# Patient Record
Sex: FEMALE | Race: BLACK OR AFRICAN AMERICAN | NOT HISPANIC OR LATINO | Employment: FULL TIME | URBAN - METROPOLITAN AREA
[De-identification: names, ages, dates, MRNs, and addresses within clinical notes are randomized per-mention and may not be internally consistent; named-entity substitution may affect disease eponyms.]

---

## 2017-09-13 ENCOUNTER — OFFICE VISIT (OUTPATIENT)
Dept: FAMILY MEDICINE | Facility: CLINIC | Age: 47
End: 2017-09-13
Payer: COMMERCIAL

## 2017-09-13 ENCOUNTER — RADIANT APPOINTMENT (OUTPATIENT)
Dept: GENERAL RADIOLOGY | Facility: CLINIC | Age: 47
End: 2017-09-13
Attending: PHYSICIAN ASSISTANT
Payer: COMMERCIAL

## 2017-09-13 VITALS
SYSTOLIC BLOOD PRESSURE: 157 MMHG | HEIGHT: 64 IN | BODY MASS INDEX: 30.22 KG/M2 | WEIGHT: 177 LBS | OXYGEN SATURATION: 96 % | HEART RATE: 75 BPM | TEMPERATURE: 98.4 F | DIASTOLIC BLOOD PRESSURE: 101 MMHG

## 2017-09-13 DIAGNOSIS — R51.9 CHRONIC NONINTRACTABLE HEADACHE, UNSPECIFIED HEADACHE TYPE: ICD-10-CM

## 2017-09-13 DIAGNOSIS — M25.552 HIP PAIN, LEFT: ICD-10-CM

## 2017-09-13 DIAGNOSIS — N64.4 MASTODYNIA: ICD-10-CM

## 2017-09-13 DIAGNOSIS — I10 ESSENTIAL HYPERTENSION WITH GOAL BLOOD PRESSURE LESS THAN 130/80: ICD-10-CM

## 2017-09-13 DIAGNOSIS — Z00.00 ROUTINE GENERAL MEDICAL EXAMINATION AT A HEALTH CARE FACILITY: Primary | ICD-10-CM

## 2017-09-13 DIAGNOSIS — D24.9 FIBROADENOMA OF BREAST, UNSPECIFIED LATERALITY: ICD-10-CM

## 2017-09-13 DIAGNOSIS — R22.1 LUMP IN NECK: ICD-10-CM

## 2017-09-13 DIAGNOSIS — G89.29 CHRONIC NONINTRACTABLE HEADACHE, UNSPECIFIED HEADACHE TYPE: ICD-10-CM

## 2017-09-13 PROCEDURE — 72170 X-RAY EXAM OF PELVIS: CPT

## 2017-09-13 PROCEDURE — 99213 OFFICE O/P EST LOW 20 MIN: CPT | Mod: 25 | Performed by: PHYSICIAN ASSISTANT

## 2017-09-13 PROCEDURE — 99396 PREV VISIT EST AGE 40-64: CPT | Performed by: PHYSICIAN ASSISTANT

## 2017-09-13 PROCEDURE — 73502 X-RAY EXAM HIP UNI 2-3 VIEWS: CPT

## 2017-09-13 RX ORDER — LISINOPRIL 2.5 MG/1
2.5 TABLET ORAL DAILY
Qty: 30 TABLET | Refills: 1 | Status: SHIPPED | OUTPATIENT
Start: 2017-09-13 | End: 2017-10-16

## 2017-09-13 NOTE — MR AVS SNAPSHOT
After Visit Summary   9/13/2017    Makayla Diaz    MRN: [id number]           Patient Information     Date Of Birth          1970        Visit Information        Provider Department      9/13/2017 9:00 AM Angelica Hills PA-C [facility]        Today's Diagnoses     Routine general medical examination at a health care facility    -  1    Essential hypertension with goal blood pressure less than 130/80        Fibroadenoma of breast, unspecified laterality        Mastodynia        Chronic nonintractable headache, unspecified headache type        Hip pain, left        Lump in neck          Care Instructions    Start lisinopril for blood pressure             Follow-ups after your visit        Follow-up notes from your care team     Return in about 4 weeks (around 10/11/2017) for BP Recheck.      Your next 10 appointments already scheduled     Sep 14, 2017  7:15 AM CDT   MA SCREENING DIGITAL BILATERAL with FKMA1   [facility] [place] ([facility])    [address] [place] [id number] [phone]           Do not use any powder, lotion or deodorant under your arms or on your breast. If you do, we will ask you to remove it before your exam.  Wear comfortable, two-piece clothing.  If you have any allergies, tell your care team.  Bring any previous mammograms from other facilities or have them mailed to the breast center.            Sep 18, 2017  8:00 AM CDT   US HEAD NECK SOFT TISSUE with BEUS1   [facility] [place] ([facility])    [address] [id number] [phone]           Please bring a list of your medicines (including vitamins, minerals and over-the-counter drugs). Also, tell your doctor about any allergies you may have. Wear comfortable clothes and leave your valuables at home.  You do not need to do anything special to prepare for your exam.  Please call the Imaging Department at  your exam site with any questions.            Sep 18, 2017  8:30 AM CDT   CT MAXILLOFACIAL W/O CONTRAST with BECT1   [facility] [place] ([facility])    [address]  [place] [id number] [phone]           Please bring any scans or X-rays taken at other hospitals, if similar tests were done. Also bring a list of your medicines, including vitamins, minerals and over-the-counter drugs. It is safest to leave personal items at home.  Be sure to tell your doctor:   If you have any allergies.   If there s any chance you are pregnant.   If you are breastfeeding.   If you have any special needs.  You do not need to do anything special to prepare.  Please wear loose clothing, such as a sweat suit or jogging clothes. Avoid snaps, zippers and other metal. We may ask you to undress and put on a hospital gown.            Oct 11, 2017  8:40 AM CDT   Office Visit with Angelica Hills PA-C   [facility]    [address] [address] [phone]           Bring a current list of meds and any records pertaining to this visit. For Physicals, please bring immunization records and any forms needing to be filled out. Please arrive 10 minutes early to complete paperwork.              Future tests that were ordered for you today     Open Future Orders        Priority Expected Expires Ordered    MA Screening Digital Bilateral Routine  9/13/2018 9/13/2017    XR Pelvis 1/2 Views Routine 9/13/2017 9/13/2018 9/13/2017    XR Hip Left 2-3 Views Routine 9/13/2017 9/13/2018 9/13/2017    CT Maxillofacial w/o Contrast Routine  9/13/2018 9/13/2017    US Head Neck Soft Tissue Routine  9/13/2018 9/13/2017            Who to contact     If you have questions or need follow up information about today's clinic visit or your schedule please contact [facility] directly at [phone].  Normal or  "non-critical lab and imaging results will be communicated to you by MyChart, letter or phone within 4 business days after the clinic has received the results. If you do not hear from us within 7 days, please contact the clinic through [employer] or phone. If you have a critical or abnormal lab result, we will notify you by phone as soon as possible.  Submit refill requests through [employer] or call your pharmacy and they will forward the refill request to us. Please allow 3 business days for your refill to be completed.          Additional Information About Your Visit        [facility] Information     [employer] gives you secure access to your electronic health record. If you see a primary care provider, you can also send messages to your care team and make appointments. If you have questions, please call your primary care clinic.  If you do not have a primary care provider, please call [phone] and they will assist you.        Care EveryWhere ID     This is your Care EveryWhere ID. This could be used by other organizations to access your [facility] medical records  [id number]        Your Vitals Were     Pulse Temperature Height Last Period Pulse Oximetry BMI (Body Mass Index)    75 98.4  F (36.9  C) (Oral) 5' 4.25\" (1.632 m) 02/28/2014 96% 30.15 kg/m2       Blood Pressure from Last 3 Encounters:   09/13/17 (!) 157/101   08/05/16 (!) 130/99   07/11/16 118/88    Weight from Last 3 Encounters:   09/13/17 177 lb (80.3 kg)   08/02/16 174 lb (78.9 kg)   07/11/16 175 lb (79.4 kg)                 Where to get your medicines      These medications were sent to [facility] - [place], [place] - [address]  [address], [facility] [address]     Phone:  [phone]     lisinopril 2.5 MG tablet          Primary Care Provider Office Phone # Fax #    Angelica Hills PA-C [phone]       [address]  [facility] [address]        Equal Access to " Services     [facility]: Hadii aad ku hadbrittnialejandro Patriziadavid, waaxda luqadaha, qaybta kaalmatrav cedeno, ba contreras. So [facility] [phone].    ATENCIÓN: Si habla español, tiene a alex disposición servicios gratuitos de asistencia lingüística. Llame al [phone].    We comply with applicable federal civil rights laws and [place] laws. We do not discriminate on the basis of race, color, national origin, age, disability sex, sexual orientation or gender identity.            Thank you!     Thank you for choosing [facility]  for your care. Our goal is always to provide you with excellent care. Hearing back from our patients is one way we can continue to improve our services. Please take a few minutes to complete the written survey that you may receive in the mail after your visit with us. Thank you!             Your Updated Medication List - Protect others around you: Learn how to safely use, store and throw away your medicines at www.disposemymeds.org.          This list is accurate as of: 9/13/17 10:18 AM.  Always use your most recent med list.                   Brand Name Dispense Instructions for use Diagnosis    diphenhydrAMINE-acetaminophen  MG tablet    TYLENOL PM     Take 1 tablet by mouth nightly as needed for sleep        lisinopril 2.5 MG tablet    PRINIVIL/Zestril    30 tablet    Take 1 tablet (2.5 mg) by mouth daily    Essential hypertension with goal blood pressure less than 130/80

## 2017-09-13 NOTE — PROGRESS NOTES
SUBJECTIVE:   CC: Makayla Diaz is an 47 year old woman who presents for preventive health visit.     Physical   Annual:     Getting at least 3 servings of Calcium per day::  Yes    Bi-annual eye exam::  Yes    Dental care twice a year::  Yes    Sleep apnea or symptoms of sleep apnea::  None    Diet::  Regular (no restrictions)    Frequency of exercise::  1 day/week    Duration of exercise::  Less than 15 minutes    Taking medications regularly::  Yes    Medication side effects::  None    Additional concerns today::  YES          Left hip pain fell in September of 2013.  Landed right on left hip.  Was better for some time but now every once in a while her leg hurts with numbness in foot.  Has back pain with sciatic on right side.  Never been seen for hip.     lump in left side of neck x 2 months.  Is painful.  No fevers.  Has night sweats before.  No weight loss.    Light sleeper always.  Tylenol pm does help.    Headaches since she had children.  Sleep didn't change.    Has hx of chronic sinus congestion   Stopped blood pressure medication due to running out.        Today's PHQ-2 Score:   PHQ-2 ( 1999 Pfizer) 9/13/2017   Q1: Little interest or pleasure in doing things 0   Q2: Feeling down, depressed or hopeless 0   PHQ-2 Score 0   Q1: Little interest or pleasure in doing things Not at all   Q2: Feeling down, depressed or hopeless Not at all   PHQ-2 Score 0       Abuse: Current or Past(Physical, Sexual or Emotional)- No  Do you feel safe in your environment - Yes    Social History   Substance Use Topics     Smoking status: Never Smoker     Smokeless tobacco: Never Used     Alcohol use Yes      Comment: 0-2 glasses wine monthly     The patient does not drink >3 drinks per day nor >7 drinks per week.    Reviewed orders with patient.  Reviewed health maintenance and updated orders accordingly - Yes      Patient under age 50, mutual decision reflected in health maintenance.        Pertinent mammograms are  "reviewed under the imaging tab.  History of abnormal Pap smear: Status post benign hysterectomy. Health Maintenance and Surgical History updated.    Reviewed and updated as needed this visit by clinical staffTobacco  Allergies  Med Hx  Surg Hx  Fam Hx  Soc Hx        Reviewed and updated as needed this visit by Provider              ROS:  C: NEGATIVE for fever, chills, change in weight  I: NEGATIVE for worrisome rashes, moles or lesions  E: NEGATIVE for vision changes or irritation  ENT: hearing loss in left ear, has hx of sinus problems   R: NEGATIVE for significant cough or SOB  BREAST: has breast lumps and breat pain for years   CV: NEGATIVE for chest pain, palpitations or peripheral edema  GI: occasional cramping lower abdomen.  Had hysterectomy but has one ovary.  Does alternate between constipation and soft stools.  Had a colonoscopy last year that was negative.   : NEGATIVE for unusual urinary or vaginal symptoms. No vaginal bleeding.  MUSCULOSKELETAL:left hip ain   NEURO: daily headaches for years also on left side, sometimes has light sensitivity, doesn't take anything for them.  Using tylenol PM nightly for pain and sleep   P: NEGATIVE for changes in mood or affect      OBJECTIVE:   BP (!) 157/101 (BP Location: Right arm, Patient Position: Chair, Cuff Size: Adult Regular)  Pulse 75  Temp 98.4  F (36.9  C) (Oral)  Ht 5' 4.25\" (1.632 m)  Wt 177 lb (80.3 kg)  LMP 02/28/2014  SpO2 96%  BMI 30.15 kg/m2  EXAM:  GENERAL: alert, no distress and obese  EYES: Eyes grossly normal to inspection, PERRL and conjunctivae and sclerae normal  HENT: ear canals and TM's normal, nose and mouth without ulcers or lesions, oropharynx clear, oral mucous membranes moist and sinuses: maxillary, frontal tenderness on bilaterally  NECK: no asymmetry, masses, or scars, thyroid normal to palpation and small firm mass on left anterior neck lateral to thyroid   RESP: lungs clear to auscultation - no rales, rhonchi or " wheezes  BREAST: no palpable axillary masses or adenopathy and tenderness bilateral  CV: regular rate and rhythm, normal S1 S2, no S3 or S4, no murmur, click or rub, no peripheral edema and peripheral pulses strong  ABDOMEN: tenderness generalized and bowel sounds normal  MS: tenderness over left hip and groin, pain with both internal and external rotation of left hip  SKIN: no suspicious lesions or rashes  Comprehensive back pain exam:  Tenderness of low back central , Lower extremity strength functional and equal on both sides, Lower extremity reflexes within normal limits bilaterally and Straight leg positive on  left, indicating possible ipsilateral radiculopathy  PSYCH: mentation appears normal, affect normal/bright    ASSESSMENT/PLAN:   1. Routine general medical examination at a health care facility  Overall healthy.  Work on diet and exercise  - MA Screening Digital Bilateral; Future    2. Essential hypertension with goal blood pressure less than 130/80  Restart medications.  Follow up in one month.  Low dose was effective before  - lisinopril (PRINIVIL/ZESTRIL) 2.5 MG tablet; Take 1 tablet (2.5 mg) by mouth daily  Dispense: 30 tablet; Refill: 1    3. Fibroadenoma of breast, unspecified laterality  Repeat mammogram yearly    4. Mastodynia  Wear a good supportive bra, even at night    5. Chronic nonintractable headache, unspecified headache type  Was to get from ENT but didn't.  Now willing with headaches and ear discomfort.    - CT Maxillofacial w/o Contrast; Future    6. Hip pain, left  Follow up when xray results are done  - XR Pelvis 1/2 Views; Future  - XR Hip Left 2-3 Views; Future    7. Lump in neck  Unsure of cause.  Most likely reactive.  Given length of time and location with associated symptoms of ear fullness get imaging.     - US Head Neck Soft Tissue; Future    COUNSELING:  Reviewed preventive health counseling, as reflected in patient instructions       Regular exercise       Healthy  "diet/nutrition       Osteoporosis Prevention/Bone Health         reports that she has never smoked. She has never used smokeless tobacco.    Estimated body mass index is 30.15 kg/(m^2) as calculated from the following:    Height as of this encounter: 5' 4.25\" (1.632 m).    Weight as of this encounter: 177 lb (80.3 kg).   Weight management plan: Discussed healthy diet and exercise guidelines and patient will follow up in 12 months in clinic to re-evaluate.    Counseling Resources:  ATP IV Guidelines  Pooled Cohorts Equation Calculator  Breast Cancer Risk Calculator  FRAX Risk Assessment  ICSI Preventive Guidelines  Dietary Guidelines for Americans, 2010  USDA's MyPlate  ASA Prophylaxis  Lung CA Screening    Angelica Hills PA-C  [facility]  Answers for HPI/ROS submitted by the patient on 9/13/2017   PHQ-2 Score: 0    "

## 2017-09-13 NOTE — NURSING NOTE
"Chief Complaint   Patient presents with     Physical     Health Maintenance       Initial BP (!) 157/101 (BP Location: Right arm, Patient Position: Chair, Cuff Size: Adult Regular)  Pulse 75  Temp 98.4  F (36.9  C) (Oral)  Ht 5' 4.25\" (1.632 m)  Wt 177 lb (80.3 kg)  LMP 02/28/2014  SpO2 96%  BMI 30.15 kg/m2 Estimated body mass index is 30.15 kg/(m^2) as calculated from the following:    Height as of this encounter: 5' 4.25\" (1.632 m).    Weight as of this encounter: 177 lb (80.3 kg).  Medication Reconciliation: complete   Katie Cavazos ma      "

## 2017-09-14 ENCOUNTER — RADIANT APPOINTMENT (OUTPATIENT)
Dept: MAMMOGRAPHY | Facility: CLINIC | Age: 47
End: 2017-09-14
Attending: PHYSICIAN ASSISTANT
Payer: COMMERCIAL

## 2017-09-14 DIAGNOSIS — Z12.31 VISIT FOR SCREENING MAMMOGRAM: ICD-10-CM

## 2017-09-14 PROCEDURE — 77063 BREAST TOMOSYNTHESIS BI: CPT | Mod: TC

## 2017-09-14 PROCEDURE — G0202 SCR MAMMO BI INCL CAD: HCPCS | Mod: TC

## 2017-09-18 ENCOUNTER — RADIANT APPOINTMENT (OUTPATIENT)
Dept: CT IMAGING | Facility: CLINIC | Age: 47
End: 2017-09-18
Attending: PHYSICIAN ASSISTANT
Payer: COMMERCIAL

## 2017-09-18 ENCOUNTER — RADIANT APPOINTMENT (OUTPATIENT)
Dept: ULTRASOUND IMAGING | Facility: CLINIC | Age: 47
End: 2017-09-18
Attending: PHYSICIAN ASSISTANT
Payer: COMMERCIAL

## 2017-09-18 DIAGNOSIS — R51.9 CHRONIC NONINTRACTABLE HEADACHE, UNSPECIFIED HEADACHE TYPE: ICD-10-CM

## 2017-09-18 DIAGNOSIS — R22.1 LUMP IN NECK: ICD-10-CM

## 2017-09-18 DIAGNOSIS — G89.29 CHRONIC NONINTRACTABLE HEADACHE, UNSPECIFIED HEADACHE TYPE: ICD-10-CM

## 2017-09-18 PROCEDURE — 76536 US EXAM OF HEAD AND NECK: CPT

## 2017-09-18 PROCEDURE — 70486 CT MAXILLOFACIAL W/O DYE: CPT | Mod: TC

## 2017-09-27 ENCOUNTER — OFFICE VISIT (OUTPATIENT)
Dept: OTOLARYNGOLOGY | Facility: CLINIC | Age: 47
End: 2017-09-27
Payer: COMMERCIAL

## 2017-09-27 VITALS — BODY MASS INDEX: 30.21 KG/M2 | TEMPERATURE: 97.6 F | WEIGHT: 177.4 LBS | RESPIRATION RATE: 16 BRPM

## 2017-09-27 DIAGNOSIS — R51.9 CHRONIC DAILY HEADACHE: Primary | ICD-10-CM

## 2017-09-27 PROCEDURE — 99243 OFF/OP CNSLTJ NEW/EST LOW 30: CPT | Performed by: OTOLARYNGOLOGY

## 2017-09-27 NOTE — PATIENT INSTRUCTIONS
General Scheduling Information  To schedule your CT/MRI scan, please contact [place] [facility] at [phone]   [address] [place] [address]    To schedule your Surgery, please contact our Specialty Schedulers at [phone]    ENT Clinic Locations Clinic Hours Telephone Number     [facility]  [address] [place] [address]   Tuesday:       8:00am -- 4:00pm    Wednesday:  8:00am - 4:00pm   To schedule an appointment with   Dr. Guillaume,   please contact our   Specialty Scheduling Department at:     [phone]       [facility]  [address] [place] [address]   Friday:          8:00am - 4:00pm         Urgent Care Locations Clinic Hours Telephone Numbers     [facility]  [address] [place] [address]     Monday-Friday:     11:00pm - 9:00pm    Saturday-Sunday:  9:00am - 5:00pm   [phone]     [facility]  [address] [place] [address]     Monday-Friday:      5:00pm - 9:00pm     Saturday-Sunday:  9:00am - 5:00pm   [phone]

## 2017-09-27 NOTE — NURSING NOTE
"Chief Complaint   Patient presents with     Sinus Problem     Sinus pressure     Ear Problem     Bilateral ear pain        Initial Temp 97.6  F (36.4  C) (Tympanic)  Resp 16  Wt 80.5 kg (177 lb 6.4 oz)  LMP 02/28/2014  BMI 30.21 kg/m2 Estimated body mass index is 30.21 kg/(m^2) as calculated from the following:    Height as of 9/13/17: 1.632 m (5' 4.25\").    Weight as of this encounter: 80.5 kg (177 lb 6.4 oz).  Medication Reconciliation: complete     Cindi Pinzon CMA      "

## 2017-09-27 NOTE — PROGRESS NOTES
I am seeing this patient in consultation for sinusitis at the request of the provider Angelica Hills.    Chief Complaint - headache    History of Present Illness - Makayla Diaz is a 47 year old female who presents for evaluation of possible headache, and possible sinusitis as a cause. The patient describes symptoms of headache, left ear symptoms. Feels inside of left ear is swollen. She describes daily headache over left forehead. Presently, the patient is symptomatic. These symptoms have been a chronic problem for years. Treatments have included antibiotics, nasal steroids, headache injections, neck treatment. No prior history of sinus surgery. CT images from 9/18/2017 were normal. Has some left ear fullness. She feels the left ear can sometimes have hearing loss. Comes back in minutes. Has seen neurology for headaches. Has a history of sleep-walking.     Past Medical History -   Patient Active Problem List   Diagnosis     CARDIOVASCULAR SCREENING; LDL GOAL LESS THAN 160     Obesity     Mastodynia     Fibroadenoma of breast     Neck pain     Headache     Neck pain on left side     Mid back pain on left side     ACP (advance care planning)       Current Medications -   Current Outpatient Prescriptions:      lisinopril (PRINIVIL/ZESTRIL) 2.5 MG tablet, Take 1 tablet (2.5 mg) by mouth daily, Disp: 30 tablet, Rfl: 1     diphenhydrAMINE-acetaminophen (TYLENOL PM)  MG tablet, Take 1 tablet by mouth nightly as needed for sleep, Disp: , Rfl:     Allergies -   Allergies   Allergen Reactions     Strawberry Anaphylaxis       Social History -   Social History     Social History     Marital status:      Spouse name: N/A     Number of children: 2     Years of education: 15     Occupational History      [employer]     Social History Main Topics     Smoking status: Never Smoker     Smokeless tobacco: Never Used     Alcohol use Yes      Comment: 0-2 glasses wine monthly     Drug use: No     Sexual  activity: Yes     Partners: Male     Birth control/ protection: Surgical     Other Topics Concern     Parent/Sibling W/ Cabg, Mi Or Angioplasty Before 65f 55m? No      Service No     Blood Transfusions No     Caffeine Concern No     Occupational Exposure No     Hobby Hazards No     Sleep Concern Yes     Stress Concern No     Weight Concern Yes     Special Diet Yes     Back Care No     Exercise No     Bike Helmet No     Seat Belt Yes     Self-Exams Yes     Social History Narrative       Family History -   Family History   Problem Relation Age of Onset     DIABETES Mother      HEART DISEASE Mother      age 48 had valve problems     Hypertension Mother      Neurologic Disorder Mother 18     migraine     Breast Cancer Maternal Grandmother      Thyroid Disease Sister      Hypertension Sister      Hypertension Brother        Review of Systems - As per HPI and PMHx, otherwise 7 system review of the head and neck negative.    Physical Exam  LMP 02/28/2014  General - The patient is nontoxic, in no distress. Alert and oriented to person and place, answers questions and cooperates with examination appropriately.   Neurologic - CN II-XII are intact. No focal neurologic deficits.   Voice and Breathing - The patient was breathing comfortably without the use of accessory muscles. There was no wheezing, stridor, or stertor.  The patients voice was clear and strong.  Eyes - Extraocular movements intact.  Sclera were not icteric or injected, conjunctiva were pink and moist.  Mouth - Examination of the oral cavity showed pink, healthy oral mucosa. No lesions or ulcerations noted.  The tongue was mobile and midline.  Throat - The walls of the oropharynx were smooth, symmetric, and had no lesions or ulcerations.  No postnasal drainage.  The uvula was midline on elevation.  Ears - Bilateral pinna and EACs with normal appearing overlying skin. Tympanic membrane intact bilaterally. Bony landmarks of the ossicular chain are normal.  No retraction, perforation, or masses.  No fluid or purulence was seen in the external canal or the middle ear.   Nose - External contour is symmetric, no gross deflection or scars.  Nasal mucosa is pink and moist with no abnormal mucus.  The septum was midline and non-obstructive, turbinates of normal size and position.  No polyps, masses, or purulence noted on examination.  Neck - Palpation of the occipital, submental, submandibular, internal jugular chain, and supraclavicular nodes did not demonstrate any abnormal lymph nodes or masses. No parotid masses. Palpation of the thyroid was soft and smooth, with no nodules or goiter appreciated.  The trachea was mobile and midline.        A/P - Makayla Diaz is a 47 year old female with headache and eustachian tube dysfunction. CT was normal, and so I don't think this is sinusitis. Will try elavil for chronic daily headache. If this fails, can titrate dose, then see neurology. Her ear troubles maybe eustachian tube dysfunction. She can try popping ears or use decongestants as needed. Normal U/S neck, unsure pain, but nothing worrisome.       Jimi Guillaume MD  Otolaryngology  [facility]

## 2017-09-27 NOTE — MR AVS SNAPSHOT
After Visit Summary   9/27/2017    Makayla Diaz    MRN: [id number]           Patient Information     Date Of Birth          1970        Visit Information        Provider Department      9/27/2017 8:45 AM Jimi Guillaume MD [facility]        Today's Diagnoses     Chronic daily headache    -  1      Care Instructions    General Scheduling Information  To schedule your CT/MRI scan, please contact [place] [facility] at [phone] [address] [place] [address]    To schedule your Surgery, please contact our Specialty Schedulers at [phone]    ENT Clinic Locations Clinic Hours Telephone Number     [facility] [place]  [address]  [place] [address]   Tuesday:       8:00am -- 4:00pm    Wednesday:  8:00am - 4:00pm   To schedule an appointment with   Dr. Guillaume,   please contact our   Specialty Scheduling Department at:     [phone]       [facility]  [address] [place] [address]   Friday:          8:00am - 4:00pm         Urgent Care Locations Clinic Hours Telephone Numbers     [facility]  [address]  [place] [address]     Monday-Friday:     11:00pm - 9:00pm    Saturday-Sunday:  9:00am - 5:00pm   [phone]     [facility] [place]  [address] [place] [address]     Monday-Friday:      5:00pm - 9:00pm     Saturday-Sunday:  9:00am - 5:00pm   [phone]                 Follow-ups after your visit        Your next 10 appointments already scheduled     Oct 11, 2017  8:40 AM CDT   Office Visit with Angelica Hills PA-C   [facility]    [address] [address] [phone]           Bring a current list of meds and any records pertaining to this visit. For Physicals, please bring immunization records and any forms needing to be filled out. Please arrive 10 minutes early to complete paperwork.               Who to contact     If you have questions or need follow up information about today's clinic visit or your schedule please contact [facility] directly at [phone].  Normal or non-critical lab and imaging results will be communicated to you by Zulahart, letter or phone within 4 business days after the clinic has received the results. If you do not hear from us within 7 days, please contact the clinic through Zulahart or phone. If you have a critical or abnormal lab result, we will notify you by phone as soon as possible.  Submit refill requests through [email or web address] or call your pharmacy and they will forward the refill request to us. Please allow 3 business days for your refill to be completed.          Additional Information About Your Visit        [email or web address] Information     [email or web address] gives you secure access to your electronic health record. If you see a primary care provider, you can also send messages to your care team and make appointments. If you have questions, please call your primary care clinic.  If you do not have a primary care provider, please call [phone] and they will assist you.        Care EveryWhere ID     This is your Care EveryWhere ID. This could be used by other organizations to access your [place] medical records  [id number]        Your Vitals Were     Temperature Respirations Last Period BMI (Body Mass Index)          97.6  F (36.4  C) (Tympanic) 16 02/28/2014 30.21 kg/m2         Blood Pressure from Last 3 Encounters:   09/13/17 (!) 157/101   08/05/16 (!) 130/99   07/11/16 118/88    Weight from Last 3 Encounters:   09/27/17 80.5 kg (177 lb 6.4 oz)   09/13/17 80.3 kg (177 lb)   08/02/16 78.9 kg (174 lb)              Today, you had the following     No orders found for display         Today's Medication Changes          These changes are accurate as of: 9/27/17  4:54 PM.  If you have any questions, ask your nurse or doctor.               Start taking these medicines.         Dose/Directions    amitriptyline 25 MG tablet   Commonly known as:  ELAVIL   Used for:  Chronic daily headache   Started by:  Jimi Guillaume MD        Dose:  25 mg   Take 1 tablet (25 mg) by mouth At Bedtime   Quantity:  30 tablet   Refills:  3            Where to get your medicines      These medications were sent to [facility] - [place], [place] - [address]  [address], [place] [address]     Phone:  [phone]     amitriptyline 25 MG tablet                Primary Care Provider Office Phone # Fax #    Angelica Hills PA-C [phone] [address] [address]        Equal Access to Services     BEATRICE LANIER : Hadii aad ku hadasho Soomaali, waaxda luqadaha, qaybta kaalmada adeegyada, waxay jacquelinein haysudhir bolanos . So [facility] [phone].    ATENCIÓN: Si habla español, tiene a alex disposición servicios gratuitos de asistencia lingüística. [facility] [phone].    We comply with applicable federal civil rights laws and [place] laws. We do not discriminate on the basis of race, color, national origin, age, disability sex, sexual orientation or gender identity.            Thank you!     Thank you for choosing [facility]  for your care. Our goal is always to provide you with excellent care. Hearing back from our patients is one way we can continue to improve our services. Please take a few minutes to complete the written survey that you may receive in the mail after your visit with us. Thank you!             Your Updated Medication List - Protect others around you: Learn how to safely use, store and throw away your medicines at www.disposemymeds.org.          This list is accurate as of: 9/27/17  4:54 PM.  Always use your most recent med list.                   Brand Name Dispense Instructions for use Diagnosis    amitriptyline 25 MG tablet    ELAVIL    30 tablet    Take 1 tablet (25 mg) by mouth  At Bedtime    Chronic daily headache       diphenhydrAMINE-acetaminophen  MG tablet    TYLENOL PM     Take 1 tablet by mouth nightly as needed for sleep        lisinopril 2.5 MG tablet    PRINIVIL/Zestril    30 tablet    Take 1 tablet (2.5 mg) by mouth daily    Essential hypertension with goal blood pressure less than 130/80

## 2017-10-16 ENCOUNTER — OFFICE VISIT (OUTPATIENT)
Dept: FAMILY MEDICINE | Facility: CLINIC | Age: 47
End: 2017-10-16
Payer: COMMERCIAL

## 2017-10-16 VITALS
DIASTOLIC BLOOD PRESSURE: 84 MMHG | WEIGHT: 177 LBS | BODY MASS INDEX: 30.15 KG/M2 | SYSTOLIC BLOOD PRESSURE: 126 MMHG | TEMPERATURE: 98 F | HEART RATE: 86 BPM | OXYGEN SATURATION: 99 %

## 2017-10-16 DIAGNOSIS — S76.012S MUSCLE STRAIN OF GLUTEAL REGION, LEFT, SEQUELA: ICD-10-CM

## 2017-10-16 DIAGNOSIS — I10 ESSENTIAL HYPERTENSION WITH GOAL BLOOD PRESSURE LESS THAN 130/80: ICD-10-CM

## 2017-10-16 DIAGNOSIS — G89.29 CHRONIC NONINTRACTABLE HEADACHE, UNSPECIFIED HEADACHE TYPE: ICD-10-CM

## 2017-10-16 DIAGNOSIS — M54.2 NECK PAIN ON LEFT SIDE: Primary | ICD-10-CM

## 2017-10-16 DIAGNOSIS — R51.9 CHRONIC NONINTRACTABLE HEADACHE, UNSPECIFIED HEADACHE TYPE: ICD-10-CM

## 2017-10-16 PROCEDURE — 99214 OFFICE O/P EST MOD 30 MIN: CPT | Performed by: PHYSICIAN ASSISTANT

## 2017-10-16 RX ORDER — LISINOPRIL 2.5 MG/1
2.5 TABLET ORAL DAILY
Qty: 90 TABLET | Refills: 1 | Status: SHIPPED | OUTPATIENT
Start: 2017-10-16 | End: 2018-02-06 | Stop reason: ALTCHOICE

## 2017-10-16 RX ORDER — METHOCARBAMOL 500 MG/1
1000 TABLET, FILM COATED ORAL 3 TIMES DAILY PRN
Qty: 30 TABLET | Refills: 1 | Status: SHIPPED | OUTPATIENT
Start: 2017-10-16 | End: 2018-01-08

## 2017-10-16 NOTE — MR AVS SNAPSHOT
After Visit Summary   10/16/2017    Makayla Diaz    MRN: [id number]           Patient Information     Date Of Birth          1970        Visit Information        Provider Department      10/16/2017 6:40 AM Angelica Hills PA-C [facility]        Today's Diagnoses     Neck pain on left side    -  1    Chronic nonintractable headache, unspecified headache type        Essential hypertension with goal blood pressure less than 130/80        Muscle strain of gluteal region, left, sequela          Care Instructions    Try robaxin for neck pain to see if it prevents the headaches.    Keep taking the amitriptyline            Follow-ups after your visit        Additional Services     [facility] PT, HAND, AND CHIROPRACTIC REFERRAL       **This order will print in the [facility] Scheduling Office**    Physical Therapy, Hand Therapy and Chiropractic Care are available through:    *[facility]  *[facility]  *[facility]    Call one number to schedule at any of the above locations: [phone].    Your provider has referred you to: Physical Therapy at [facility] or [facility]    Indication/Reason for Referral: Low Back Pain  Onset of Illness: several months  Therapy Orders: Evaluate and Treat  Special Programs: None  Special Request: None    Johnny Turner      Additional Comments for the Therapist or Chiropractor:     Please be aware that coverage of these services is subject to the terms and limitations of your health insurance plan.  Call member services at your health plan with any benefit or coverage questions.      Please bring the following to your appointment:    *Your personal calendar for scheduling future appointments  *Comfortable clothing                  Follow-up notes from your care team     Return in about 3 months (around 1/16/2018).      Who to contact     If you have questions or need follow up information about today's clinic  visit or your schedule please contact [facility] directly at [phone].  Normal or non-critical lab and imaging results will be communicated to you by MyChart, letter or phone within 4 business days after the clinic has received the results. If you do not hear from us within 7 days, please contact the clinic through Qpixel Technologyhart or phone. If you have a critical or abnormal lab result, we will notify you by phone as soon as possible.  Submit refill requests through SpongeFish or call your pharmacy and they will forward the refill request to us. Please allow 3 business days for your refill to be completed.          Additional Information About Your Visit        [employer] Information     SpongeFish gives you secure access to your electronic health record. If you see a primary care provider, you can also send messages to your care team and make appointments. If you have questions, please call your primary care clinic.  If you do not have a primary care provider, please call [phone] and they will assist you.        Care EveryWhere ID     This is your Care EveryWhere ID. This could be used by other organizations to access your [place] medical records  [id number]        Your Vitals Were     Pulse Temperature Last Period Pulse Oximetry BMI (Body Mass Index)       86 98  F (36.7  C) (Oral) 02/28/2014 99% 30.15 kg/m2        Blood Pressure from Last 3 Encounters:   10/16/17 126/84   09/13/17 (!) 157/101   08/05/16 (!) 130/99    Weight from Last 3 Encounters:   10/16/17 177 lb (80.3 kg)   09/27/17 177 lb 6.4 oz (80.5 kg)   09/13/17 177 lb (80.3 kg)              We Performed the Following     [facility] PT, HAND, AND CHIROPRACTIC REFERRAL          Today's Medication Changes          These changes are accurate as of: 10/16/17  7:15 AM.  If you have any questions, ask your nurse or doctor.               Start taking these medicines.        Dose/Directions    methocarbamol 500 MG tablet   Commonly known as:  ROBAXIN    Used for:  Neck pain on left side   Started by:  Angelica Hills PA-C        Dose:  1000 mg   Take 2 tablets (1,000 mg) by mouth 3 times daily as needed for muscle spasms   Quantity:  30 tablet   Refills:  1            Where to get your medicines      These medications were sent to [id number] - [place], [place] - [address] [address]     Phone:  [phone]     lisinopril 2.5 MG tablet    methocarbamol 500 MG tablet                Primary Care Provider Office Phone # Fax #    Angelica Hills PA-C [phone]       [address] [facility] [address]        Equal Access to Services     BEATRICE LANIER : Hadii shane rhoades hadasho Sodejonali, waaxda luqadaha, qaybta kaalmada adeegyada, ba bolanos . So [facility] [phone].    ATENCIÓN: Si habla español, tiene a alex disposición servicios gratuitos de asistencia lingüística. [facility] [phone].    We comply with applicable federal civil rights laws and [place] laws. We do not discriminate on the basis of race, color, national origin, age, disability, sex, sexual orientation, or gender identity.            Thank you!     Thank you for choosing [facility]  for your care. Our goal is always to provide you with excellent care. Hearing back from our patients is one way we can continue to improve our services. Please take a few minutes to complete the written survey that you may receive in the mail after your visit with us. Thank you!             Your Updated Medication List - Protect others around you: Learn how to safely use, store and throw away your medicines at www.disposemymeds.org.          This list is accurate as of: 10/16/17  7:15 AM.  Always use your most recent med list.                   Brand Name Dispense Instructions for use Diagnosis    amitriptyline 25 MG tablet    ELAVIL    30 tablet    Take 1 tablet (25 mg) by mouth At  Bedtime    Chronic daily headache       lisinopril 2.5 MG tablet    PRINIVIL/Zestril    90 tablet    Take 1 tablet (2.5 mg) by mouth daily    Essential hypertension with goal blood pressure less than 130/80       methocarbamol 500 MG tablet    ROBAXIN    30 tablet    Take 2 tablets (1,000 mg) by mouth 3 times daily as needed for muscle spasms    Neck pain on left side

## 2017-10-16 NOTE — PROGRESS NOTES
SUBJECTIVE:   Makayla Diaz is a 47 year old female who presents to clinic today for the following health issues:      Medication Followup of all meds    Taking Medication as prescribed: yes    Side Effects:  None    Medication Helping Symptoms:  Pt unsure       Patient is also here to discuss about her hips  She stated it is hurting on the outside.  Started when she fell over a month ago.  Imaging normal at that time.  Worse when she crosses her leg    Started amitriptyline by ENT about 3 weeks ago does help daytime headaches but still getting them at night.  CT of sinuses were unremarkable and ultrasound of neck was normal.  Her daily headaches are less frequent.  New medication is helping with sleep.  Still having headaches sometimes at night that she wakes with.  Gets about once a week.  This frequency is unchanged.  Daily headaches are pressure.  Nighttime headache always on left side behind eye.  Eye exam normal.  Thinks she gets more when she strains her neck with work.  Starts in neck as a tight cramp.  Gets some blurry vision, no nausea.          Problem list and histories reviewed & adjusted, as indicated.  Additional history: as documented    Patient Active Problem List   Diagnosis     CARDIOVASCULAR SCREENING; LDL GOAL LESS THAN 160     Obesity     Mastodynia     Fibroadenoma of breast     Neck pain     Headache     Neck pain on left side     Mid back pain on left side     ACP (advance care planning)     Essential hypertension with goal blood pressure less than 130/80     Past Surgical History:   Procedure Laterality Date     BIOPSY  2012    breast Bx left     BIOPSY  2011    right breast     COLONOSCOPY WITH CO2 INSUFFLATION N/A 8/5/2016    Procedure: COLONOSCOPY WITH CO2 INSUFFLATION;  Surgeon: Duane, William Charles, MD;  Location: [facility] OR     ESOPHAGOSCOPY, GASTROSCOPY, DUODENOSCOPY (EGD), COMBINED N/A 12/11/2014    Procedure: COMBINED ESOPHAGOSCOPY, GASTROSCOPY, DUODENOSCOPY (EGD);  Surgeon:  Jimi Waterman MD;  Location: [facility] OR     ESOPHAGOSCOPY, GASTROSCOPY, DUODENOSCOPY (EGD), COMBINED N/A 12/11/2014    Procedure: COMBINED ESOPHAGOSCOPY, GASTROSCOPY, DUODENOSCOPY (EGD), BIOPSY SINGLE OR MULTIPLE;  Surgeon: Jimi Waterman MD;  Location: [facility] OR     HYSTERECTOMY TOTAL ABDOMINAL  3/25/14    [place]      HYSTERECTOMY, PAP NO LONGER INDICATED  2014     TONSILLECTOMY   age 2     TUBAL LIGATION  2001       Social History   Substance Use Topics     Smoking status: Never Smoker     Smokeless tobacco: Never Used     Alcohol use Yes      Comment: 0-2 glasses wine monthly     Family History   Problem Relation Age of Onset     DIABETES Mother      HEART DISEASE Mother      age 48 had valve problems     Hypertension Mother      Neurologic Disorder Mother 18     migraine     Breast Cancer Maternal Grandmother      Thyroid Disease Sister      Hypertension Sister      Hypertension Brother              Reviewed and updated as needed this visit by clinical staffTobacco  Allergies  Meds  Med Hx  Surg Hx  Fam Hx  Soc Hx      Reviewed and updated as needed this visit by Provider         ROS:  As above    OBJECTIVE:     /84 (BP Location: Right arm, Patient Position: Chair, Cuff Size: Adult Regular)  Pulse 86  Temp 98  F (36.7  C) (Oral)  Wt 177 lb (80.3 kg)  LMP 02/28/2014  SpO2 99%  BMI 30.15 kg/m2  Body mass index is 30.15 kg/(m^2).  GENERAL: healthy, alert and no distress  HENT: ear canals and TM's normal, oropharynx clear and oral mucous membranes moist  NECK: no adenopathy and no asymmetry, masses, or scars  RESP: lungs clear to auscultation - no rales, rhonchi or wheezes  CV: regular rates and rhythm, normal S1 S2, no S3 or S4 and no murmur, click or rub  MS: pain over left buttocks, right not painful    Diagnostic Test Results:  Results for orders placed or performed in visit on 09/18/17   CT Maxillofacial w/o Contrast    Narrative    CT SCAN OF THE PARANASAL SINUSES AND FACE   9/18/2017 8:32 AM     HISTORY: Sinus congestion. Headache.    TECHNIQUE: Noncontrast axial scans of the sinuses with coronal and  sagittal reformations were completed. Radiation dose for this scan was  reduced using automated exposure control, adjustment of the mA and/or  kV according to patient size, or iterative reconstruction technique.      COMPARISON: None.    FINDINGS:  Frontal sinuses: The left frontal sinus is hypoplastic. No mucosal  thickening. The frontal sinus drainage pathways are clear.    Ethmoid sinuses: Clear.    Right maxillary sinus: No mucosal thickening. The ostiomeatal unit is  normal with a patent infundibulum. There is an accessory sinus ostia  medially along the posterior aspect of the maxillary sinus.    Left maxillary sinus: No mucosal thickening. The ostiomeatal unit is  normal with a patent infundibulum. There is an accessory sinus ostia  medially along the posterior aspect of the maxillary sinus.    Sphenoid sinus: Mild polypoid mucosal thickening along the anterior  right aspect of the right sphenoid sinus. The sphenoid sinus ostia and  sphenoethmoidal recesses are clear.    Nasal septum: The nasal septum is slightly deviated to the left.    Turbinates and nasal cavity: No nasal mucosal thickening. There is  zeenat bullosa of the middle turbinates bilaterally. There are  polypoid mucous retention cysts or mucosal polyps within the zeenat  bullosa bilaterally.    Laminae papyracea and cribriform plate: Normal.    Other findings: Orbits, sella, maxilla and nasopharynx appear normal.  Bony temporomandibular joints appear normal.      Impression    IMPRESSION:    1. Minimal polypoid mucosal thickening along the anterior right  sphenoid sinus. The remaining paranasal sinuses are clear. No evidence  of acute sinusitis.  2. Zeenat bullosa of the middle turbinates bilaterally which contain  polypoid mucous retention cysts or mucosal polyps.    ALFONSO MILES MD       ASSESSMENT/PLAN:       1.  Neck pain on left side  Likely causing headache at night-try muscle relaxant and follow up as needed.  If not helping headache may consider Imitrex.    - methocarbamol (ROBAXIN) 500 MG tablet; Take 2 tablets (1,000 mg) by mouth 3 times daily as needed for muscle spasms  Dispense: 30 tablet; Refill: 1    2. Chronic nonintractable headache, unspecified headache type  Improved.  Continue amitriptyline     3. Essential hypertension with goal blood pressure less than 130/80  At goal.  refilled  - lisinopril (PRINIVIL/ZESTRIL) 2.5 MG tablet; Take 1 tablet (2.5 mg) by mouth daily  Dispense: 90 tablet; Refill: 1    4. Muscle strain of gluteal region, left, sequela  Follow up as needed  - [facility] PT, HAND, AND CHIROPRACTIC REFERRAL    FUTURE APPOINTMENTS:       - Follow-up visit in 3 months    Angelica Hills PA-C  [facility]

## 2017-10-16 NOTE — NURSING NOTE
"Chief Complaint   Patient presents with     Recheck Medication       Initial /84 (BP Location: Right arm, Patient Position: Chair, Cuff Size: Adult Regular)  Pulse 86  Temp 98  F (36.7  C) (Oral)  Wt 177 lb (80.3 kg)  LMP 02/28/2014  SpO2 99%  BMI 30.15 kg/m2 Estimated body mass index is 30.15 kg/(m^2) as calculated from the following:    Height as of 9/13/17: 5' 4.25\" (1.632 m).    Weight as of this encounter: 177 lb (80.3 kg).  Medication Reconciliation: complete   Gayatri See CHANI Navarro      "

## 2017-10-26 ENCOUNTER — THERAPY VISIT (OUTPATIENT)
Dept: PHYSICAL THERAPY | Facility: CLINIC | Age: 47
End: 2017-10-26
Payer: COMMERCIAL

## 2017-10-26 DIAGNOSIS — M25.552 HIP PAIN, LEFT: Primary | ICD-10-CM

## 2017-10-26 PROCEDURE — 97110 THERAPEUTIC EXERCISES: CPT | Mod: GP | Performed by: PHYSICAL THERAPIST

## 2017-10-26 PROCEDURE — 97161 PT EVAL LOW COMPLEX 20 MIN: CPT | Mod: GP | Performed by: PHYSICAL THERAPIST

## 2017-10-26 PROCEDURE — 97140 MANUAL THERAPY 1/> REGIONS: CPT | Mod: GP | Performed by: PHYSICAL THERAPIST

## 2017-10-26 NOTE — MR AVS SNAPSHOT
After Visit Summary   10/26/2017    Makayla Diaz    MRN: [id number]           Patient Information     Date Of Birth          1970        Visit Information        Provider Department      10/26/2017 8:50 AM Dayne Gaol, PT [facility]        Today's Diagnoses     Hip pain, left    -  1       Follow-ups after your visit        Your next 10 appointments already scheduled     Nov 02, 2017  8:50 AM CDT   [facility] with Dayne Galo, PT   [facility] ([facility])    [address]  [facility] [address]   [phone]            Nov 09, 2017  8:10 AM CST   [facility] Spine with Dayne Malhotraol, PT   [facility] ([facility]    [address]  [address]  [facility] [address]   [phone]            Kush 15, 2018  8:20 AM CST   SHORT with Angelica Hills PA-C   [facility]    [address] [place] [id number] [phone]              Who to contact     If you have questions or need follow up information about today's clinic visit or your schedule please contact [facility] directly at [phone].  Normal or non-critical lab and imaging results will be communicated to you by [employer], letter or phone within 4 business days after the clinic has received the results. If you do not hear from us within 7 days, please contact the clinic through MyChart or phone. If you have a critical or abnormal lab result, we will notify you by phone as soon as possible.  Submit refill requests through Magine or call your pharmacy and they will forward the refill request to us. Please allow 3 business days for your refill to be completed.          Additional Information About Your Visit        [employer] Information     [employer] gives you secure access to your electronic health record. If you see a primary care provider, you can also send messages to your care team and make  appointments. If you have questions, please call your primary care clinic.  If you do not have a primary care provider, please call [phone] and they will assist you.        Care EveryWhere ID     This is your Care EveryWhere ID. This could be used by other organizations to access your [place] medical records  [id number]        Your Vitals Were     Last Period                   02/28/2014            Blood Pressure from Last 3 Encounters:   10/16/17 126/84   09/13/17 (!) 157/101   08/05/16 (!) 130/99    Weight from Last 3 Encounters:   10/16/17 80.3 kg (177 lb)   09/27/17 80.5 kg (177 lb 6.4 oz)   09/13/17 80.3 kg (177 lb)              We Performed the Following     HC PT EVAL, LOW COMPLEXITY     [facility] INITIAL EVAL REPORT     MANUAL THER TECH,1+REGIONS,EA 15 MIN     THERAPEUTIC EXERCISES        Primary Care Provider Office Phone # Fax #    Angelica Hills PA-C [phone]       [address] [facility] [address]        Equal Access to Services     [facility] : Hadii aad ku hadasho Soomaali, waaxda luqadaha, qaybta kaalmada adeegyada, ba bolanos . So [facility] [phone].    ATENCIÓN: Si habla español, tiene a alex disposición servicios gratuitos de asistencia lingüística. LlOhio[facility] [phone].    We comply with applicable federal civil rights laws and [place] laws. We do not discriminate on the basis of race, color, national origin, age, disability, sex, sexual orientation, or gender identity.            Thank you!     Thank you for choosing [facility]  for your care. Our goal is always to provide you with excellent care. Hearing back from our patients is one way we can continue to improve our services. Please take a few minutes to complete the written survey that you may receive in the mail after your visit with us. Thank you!             Your Updated Medication List - Protect others around you: Learn how to safely use, store and throw away  your medicines at www.disposemymeds.org.          This list is accurate as of: 10/26/17  5:44 PM.  Always use your most recent med list.                   Brand Name Dispense Instructions for use Diagnosis    amitriptyline 25 MG tablet    ELAVIL    30 tablet    Take 1 tablet (25 mg) by mouth At Bedtime    Chronic daily headache       lisinopril 2.5 MG tablet    PRINIVIL/Zestril    90 tablet    Take 1 tablet (2.5 mg) by mouth daily    Essential hypertension with goal blood pressure less than 130/80       methocarbamol 500 MG tablet    ROBAXIN    30 tablet    Take 2 tablets (1,000 mg) by mouth 3 times daily as needed for muscle spasms    Neck pain on left side

## 2017-10-26 NOTE — PROGRESS NOTES
Subjective:    Patient is a 47 year old female presenting with rehab back hpi and rehab right hip hpi. The history is provided by the patient.   Makayla Diaz is a 47 year old female with a left hip condition.  Condition occurred with:  A fall/slip.  Condition occurred: at home.  This is a chronic condition  Pt reports she fell, landing on her left hip about 1 year ago.  She describes pain down her leg, a feeling of wanting to give out, pain in her hip/glut area and occasional nmbness in her toes w/ leg pain.  .    Patient reports pain:  Greater trochanter and posterior.  Radiates to:  Gluteals, hip, thigh, knee, lower leg and foot.  Pain is described as sharp, stabbing and aching  and reported as 2/10 and 7/10.  Associated symptoms:  Loss of motion/stiffness.   Symptoms are exacerbated by activity, walking and standing (crossing leg to tie shoes ) and relieved by NSAID's.  Since onset symptoms are gradually worsening.        General health as reported by patient is good.  Pertinent medical history includes:  High blood pressure and migraines.        Current occupation is Production tech.  .  Patient is working in normal job without restrictions.  Primary job tasks include:  Prolonged standing and prolonged sitting.        Red flags:  None as reported by the patient.                        Objective:    System         Lumbar/SI Evaluation  ROM:  AROM Lumbar: normal                        SI joint/Sacrum:        Left positive at:    Ilium [employer]    Sacral conclusion left:  Outflare, locked, upslip, sacral torsion and posterior inominate                                    Hip Evaluation  HIP AROM:  AROM:   Left Hip:        Right Hip:   Not assessed                      Hip Special Testing:    Left hip positive for the following special tests:  Piriformis and Alfredito's  Left hip negative for the following special tests:  Margarita or SLR      Hip Palpation:    Left hip tenderness present at:   Greater Trachanter;  IT Band; Abductors; Iliac Crest; Gluteus Medius and Bursa               General     ROS    Assessment/Plan:      Patient is a 47 year old female with left side hip complaints.    Patient has the following significant findings with corresponding treatment plan.                Diagnosis 1:  Hip pain   Pain -  manual therapy, self management and home program  Decreased ROM/flexibility - manual therapy, therapeutic exercise and home program  Decreased joint mobility - manual therapy and therapeutic exercise  Decreased proprioception - neuro re-education and therapeutic activities  Impaired gait - gait training  Impaired muscle performance - neuro re-education  Decreased function - therapeutic activities    Therapy Evaluation Codes:   1) History comprised of:   Personal factors that impact the plan of care:      None.    Comorbidity factors that impact the plan of care are:      Migraines/headaches.     Medications impacting care: Pain.  2) Examination of Body Systems comprised of:   Body structures and functions that impact the plan of care:      Hip.   Activity limitations that impact the plan of care are:      Lifting, Squatting/kneeling, Stairs, Standing, Walking and Sleeping.  3) Clinical presentation characteristics are:   Stable/Uncomplicated.  4) Decision-Making    Low complexity using standardized patient assessment instrument and/or measureable assessment of functional outcome.  Cumulative Therapy Evaluation is: Low complexity.    Previous and current functional limitations:  (See Goal Flow Sheet for this information)    Short term and Long term goals: (See Goal Flow Sheet for this information)     Communication ability:  Patient appears to be able to clearly communicate and understand verbal and written communication and follow directions correctly.  Treatment Explanation - The following has been discussed with the patient:   RX ordered/plan of care  Anticipated outcomes  Possible risks and side effects  This  patient would benefit from PT intervention to resume normal activities.   Rehab potential is good.    Frequency:  1 X week, once daily  Duration:  for 4 weeks  Discharge Plan:  Achieve all LTG.  Independent in home treatment program.  Reach maximal therapeutic benefit.    Please refer to the daily flowsheet for treatment today, total treatment time and time spent performing 1:1 timed codes.

## 2017-11-02 ENCOUNTER — THERAPY VISIT (OUTPATIENT)
Dept: PHYSICAL THERAPY | Facility: CLINIC | Age: 47
End: 2017-11-02
Payer: COMMERCIAL

## 2017-11-02 DIAGNOSIS — M25.552 HIP PAIN, LEFT: ICD-10-CM

## 2017-11-02 PROCEDURE — 97110 THERAPEUTIC EXERCISES: CPT | Mod: GP | Performed by: PHYSICAL THERAPIST

## 2017-11-02 NOTE — MR AVS SNAPSHOT
After Visit Summary   11/2/2017    Makayla Diaz    MRN: [id number]           Patient Information     Date Of Birth          1970        Visit Information        Provider Department      11/2/2017 8:50 AM Dayne Galo, PT [facility]        Today's Diagnoses     Hip pain, left           Follow-ups after your visit        Your next 10 appointments already scheduled     Nov 09, 2017  8:10 AM CST   [facility] Spine with Dayne Galo PT   [facility] ([facility])    [address]  [address]  [place] [id number] [phone]            Kush 15, 2018  8:20 AM CST   SHORT with Angelica Hills PA-C   [facility]    [address] [place] [address] [phone]              Who to contact     If you have questions or need follow up information about today's clinic visit or your schedule please contact [facility] directly at [phone].  Normal or non-critical lab and imaging results will be communicated to you by [employer], letter or phone within 4 business days after the clinic has received the results. If you do not hear from us within 7 days, please contact the clinic through [employer]t or phone. If you have a critical or abnormal lab result, we will notify you by phone as soon as possible.  Submit refill requests through SpikeSource or call your pharmacy and they will forward the refill request to us. Please allow 3 business days for your refill to be completed.          Additional Information About Your Visit        [employer] Information     [employer] gives you secure access to your electronic health record. If you see a primary care provider, you can also send messages to your care team and make appointments. If you have questions, please call your primary care clinic.  If you do not have a primary care provider, please call [phone] and they will assist you.        Care  EveryWhere ID     This is your Care EveryWhere ID. This could be used by other organizations to access your [place] medical records  [id number]        Your Vitals Were     Last Period                   02/28/2014            Blood Pressure from Last 3 Encounters:   10/16/17 126/84   09/13/17 (!) 157/101   08/05/16 (!) 130/99    Weight from Last 3 Encounters:   10/16/17 80.3 kg (177 lb)   09/27/17 80.5 kg (177 lb 6.4 oz)   09/13/17 80.3 kg (177 lb)              We Performed the Following     THERAPEUTIC EXERCISES        Primary Care Provider Office Phone # Fax #    Angelica Hills PA-C [phone]       [address] [facility] [address]        Equal Access to Services     [facility]OM[facility] : Hadii shane rhoades hadasho Soomaali, waaxda luqadaha, qaybta kaalmada adeegyada, ba bolanos . So [facility] [place] [facility] [phone].    ATENCIÓN: Si habla español, tiene a alex disposición servicios gratuitos de asistencia lingüística. Llame al [phone].    We comply with applicable federal civil rights laws and [place] laws. We do not discriminate on the basis of race, color, national origin, age, disability, sex, sexual orientation, or gender identity.            Thank you!     Thank you for choosing [facility]  for your care. Our goal is always to provide you with excellent care. Hearing back from our patients is one way we can continue to improve our services. Please take a few minutes to complete the written survey that you may receive in the mail after your visit with us. Thank you!             Your Updated Medication List - Protect others around you: Learn how to safely use, store and throw away your medicines at www.disposemymeds.org.          This list is accurate as of: 11/2/17  9:42 AM.  Always use your most recent med list.                   Brand Name Dispense Instructions for use Diagnosis    amitriptyline 25 MG tablet    ELAVIL    30 tablet    Take 1 tablet (25 mg) by  mouth At Bedtime    Chronic daily headache       lisinopril 2.5 MG tablet    PRINIVIL/Zestril    90 tablet    Take 1 tablet (2.5 mg) by mouth daily    Essential hypertension with goal blood pressure less than 130/80       methocarbamol 500 MG tablet    ROBAXIN    30 tablet    Take 2 tablets (1,000 mg) by mouth 3 times daily as needed for muscle spasms    Neck pain on left side

## 2018-01-08 ENCOUNTER — MYC REFILL (OUTPATIENT)
Dept: FAMILY MEDICINE | Facility: CLINIC | Age: 48
End: 2018-01-08

## 2018-01-08 ENCOUNTER — OFFICE VISIT (OUTPATIENT)
Dept: FAMILY MEDICINE | Facility: CLINIC | Age: 48
End: 2018-01-08
Payer: COMMERCIAL

## 2018-01-08 VITALS
DIASTOLIC BLOOD PRESSURE: 88 MMHG | OXYGEN SATURATION: 96 % | TEMPERATURE: 97 F | BODY MASS INDEX: 30.83 KG/M2 | SYSTOLIC BLOOD PRESSURE: 139 MMHG | WEIGHT: 181 LBS | HEART RATE: 108 BPM

## 2018-01-08 DIAGNOSIS — R09.82 PND (POST-NASAL DRIP): ICD-10-CM

## 2018-01-08 DIAGNOSIS — R05.9 COUGH: ICD-10-CM

## 2018-01-08 DIAGNOSIS — R09.81 SINUS CONGESTION: Primary | ICD-10-CM

## 2018-01-08 DIAGNOSIS — R51.9 CHRONIC DAILY HEADACHE: ICD-10-CM

## 2018-01-08 PROCEDURE — 99214 OFFICE O/P EST MOD 30 MIN: CPT | Performed by: PHYSICIAN ASSISTANT

## 2018-01-08 NOTE — MR AVS SNAPSHOT
After Visit Summary   1/8/2018    Makayla Diaz    MRN: [id number]           Patient Information     Date Of Birth          1970        Visit Information        Provider Department      1/8/2018 11:20 AM Angelica Hills PA-C [facility]        Care Instructions    Try netipot and sudafed for 3 days.  If not better we will start a heartburn medicine   If you don't need any additional medicine follow up in 6 months           Follow-ups after your visit        Who to contact     If you have questions or need follow up information about today's clinic visit or your schedule please contact [facility] directly at [phone].  Normal or non-critical lab and imaging results will be communicated to you by MyChart, letter or phone within 4 business days after the clinic has received the results. If you do not hear from us within 7 days, please contact the clinic through Four Interactive[employer] or phone. If you have a critical or abnormal lab result, we will notify you by phone as soon as possible.  Submit refill requests through [facility] or call your pharmacy and they will forward the refill request to us. Please allow 3 business days for your refill to be completed.          Additional Information About Your Visit        MyChart Information     [facility] gives you secure access to your electronic health record. If you see a primary care provider, you can also send messages to your care team and make appointments. If you have questions, please call your primary care clinic.  If you do not have a primary care provider, please call [phone] and they will assist you.        Care EveryWhere ID     This is your Care EveryWhere ID. This could be used by other organizations to access your [place] medical records  [id number]        Your Vitals Were     Pulse Temperature Last Period Pulse Oximetry BMI (Body Mass Index)       108 97  F (36.1  C) (Oral) 02/28/2014 96%  30.83 kg/m2        Blood Pressure from Last 3 Encounters:   01/08/18 139/88   10/16/17 126/84   09/13/17 (!) 157/101    Weight from Last 3 Encounters:   01/08/18 181 lb (82.1 kg)   10/16/17 177 lb (80.3 kg)   09/27/17 177 lb 6.4 oz (80.5 kg)              Today, you had the following     No orders found for display       Primary Care Provider Office Phone # Fax #    Angelica Hills PA-C [phone]       [address] [facility] [address]        Equal Access to Services     [facility] : Hadii shane rhoades hadasho Sodavid, waaxda luqadaha, qaybta kaalmada ademaria del rosarioyada, ba bolanos . So [facility] [phone].    ATENCIÓN: Si habla español, tiene a alex disposición servicios gratuitos de asistencia lingüística. LlMagruder [facility] [phone].    We comply with applicable federal civil rights laws and [place] laws. We do not discriminate on the basis of race, color, national origin, age, disability, sex, sexual orientation, or gender identity.            Thank you!     Thank you for choosing [facility]  for your care. Our goal is always to provide you with excellent care. Hearing back from our patients is one way we can continue to improve our services. Please take a few minutes to complete the written survey that you may receive in the mail after your visit with us. Thank you!             Your Updated Medication List - Protect others around you: Learn how to safely use, store and throw away your medicines at www.disposemymeds.org.          This list is accurate as of: 1/8/18 11:59 AM.  Always use your most recent med list.                   Brand Name Dispense Instructions for use Diagnosis    amitriptyline 25 MG tablet    ELAVIL    30 tablet    Take 1 tablet (25 mg) by mouth At Bedtime    Chronic daily headache       lisinopril 2.5 MG tablet    PRINIVIL/Zestril    90 tablet    Take 1 tablet (2.5 mg) by mouth daily    Essential hypertension with goal  blood pressure less than 130/80

## 2018-01-08 NOTE — PROGRESS NOTES
SUBJECTIVE:   Makayla Diaz is a 47 year old female who presents to clinic today for the following health issues:      ENT Symptoms             Symptoms: cc Present Absent Comment   Fever/Chills   x    Fatigue   x    Muscle Aches   x    Eye Irritation   x    Sneezing   x    Nasal Glenn/Drg  x  Will blow nose and nothing will come out   Feels it draining down   Sinus Pressure/Pain  x     Loss of smell   x    Dental pain   x    Sore Throat   x    Swollen Glands  x     Ear Pain/Fullness  x  Swollen glad is hurting her ear    Cough  x     Wheeze   x    Chest Pain   x    Shortness of breath   x    Rash   x    Other   x      Symptom duration:  4 weeks ago    Symptom severity:  Moderate    Treatments tried:  None tried nasal spray, didn't help has not tried sudafed    Contacts:  None     Cough is the most bothersome-feels like choking.  No trouble swallowing.    No GERD symptoms.    Saw ENT-sinus clear on imaging   Noticed 3 weeks ago that she had a lump in her throat at the base of her throat. No pain but she states the lump felt mushy.  New lump.  Better today.          Problem list and histories reviewed & adjusted, as indicated.  Additional history: as documented    Patient Active Problem List   Diagnosis     CARDIOVASCULAR SCREENING; LDL GOAL LESS THAN 160     Obesity     Mastodynia     Fibroadenoma of breast     Neck pain     Headache     Neck pain on left side     Mid back pain on left side     ACP (advance care planning)     Essential hypertension with goal blood pressure less than 130/80     Hip pain, left     Past Surgical History:   Procedure Laterality Date     BIOPSY  2012    breast Bx left     BIOPSY  2011    right breast     COLONOSCOPY WITH CO2 INSUFFLATION N/A 8/5/2016    Procedure: COLONOSCOPY WITH CO2 INSUFFLATION;  Surgeon: Duane, William Charles, MD;  Location:  OR     ESOPHAGOSCOPY, GASTROSCOPY, DUODENOSCOPY (EGD), COMBINED N/A 12/11/2014    Procedure: COMBINED ESOPHAGOSCOPY, GASTROSCOPY,  DUODENOSCOPY (EGD);  Surgeon: Jimi Waterman MD;  Location: [facility] OR     ESOPHAGOSCOPY, GASTROSCOPY, DUODENOSCOPY (EGD), COMBINED N/A 12/11/2014    Procedure: COMBINED ESOPHAGOSCOPY, GASTROSCOPY, DUODENOSCOPY (EGD), BIOPSY SINGLE OR MULTIPLE;  Surgeon: Jimi Waterman MD;  Location: [facility] OR     HYSTERECTOMY TOTAL ABDOMINAL  3/25/14    [place]      HYSTERECTOMY, PAP NO LONGER INDICATED  2014     TONSILLECTOMY   age 2     TUBAL LIGATION  2001       Social History   Substance Use Topics     Smoking status: Never Smoker     Smokeless tobacco: Never Used     Alcohol use Yes      Comment: 0-2 glasses wine monthly     Family History   Problem Relation Age of Onset     DIABETES Mother      HEART DISEASE Mother      age 48 had valve problems     Hypertension Mother      Neurologic Disorder Mother 18     migraine     Breast Cancer Maternal Grandmother      Thyroid Disease Sister      Hypertension Sister      Hypertension Brother              Reviewed and updated as needed this visit by clinical staffTobacco  Allergies  Meds  Med Hx  Surg Hx  Fam Hx  Soc Hx      Reviewed and updated as needed this visit by Provider         ROS:  As above    OBJECTIVE:     /88  Pulse 108  Temp 97  F (36.1  C) (Oral)  Wt 181 lb (82.1 kg)  LMP 02/28/2014  SpO2 96%  BMI 30.83 kg/m2  Body mass index is 30.83 kg/(m^2).  GENERAL: healthy, alert and no distress  HENT: ear canals and TM's normal, nose and mouth without ulcers or lesions, oropharynx clear and oral mucous membranes moist  NECK: no adenopathy, no asymmetry, masses, or scars and thyroid normal to palpation  RESP: lungs clear to auscultation - no rales, rhonchi or wheezes  CV: regular rates and rhythm, normal S1 S2, no S3 or S4 and no murmur, click or rub  ABDOMEN: tenderness generalized and bowel sounds normal    Diagnostic Test Results:  none     ASSESSMENT/PLAN:       1. Sinus congestion  Try netipot and ok for 3 days of sudafed.  If that helps this and  below no further treatment needed.  If cough not improving try PPI.  May need referral to allergist at some point.      2. PND (post-nasal drip)  As above    3. Cough  As above      Patient Instructions   Try netipot and sudafed for 3 days.  If not better we will start a heartburn medicine   If you don't need any additional medicine follow up in 6 months       Angelica Hills PA-C  [facility]

## 2018-01-08 NOTE — TELEPHONE ENCOUNTER
Message from Survmetricshart:  Original authorizing provider: GUANACO Salmeron would like a refill of the following medications:  amitriptyline (ELAVIL) 25 MG tablet [Angelica Hills PA-C]    Preferred pharmacy: [facility] #[id number] - [place], [place] - [address]    Comment:  Need refills please.

## 2018-01-08 NOTE — TELEPHONE ENCOUNTER
Reason for Call:  Medication or medication refill:    Do you use a [facility]?  Name of the pharmacy and phone number for the current request:  [facility], pended    Name of the medication requested: amitriptyline (ELAVIL) 25 MG tablet    Other request: patient walked back to team. Provider left for meeting.  She forgot to ask for a refill before leaving today's appointment.  Please call when done.    Can we leave a detailed message on this number? YES    Phone number patient can be reached at: Home number on file [phone] (home)    Best Time: any    Call taken on 1/8/2018 at 12:06 PM by Lidya Rueda    Requested Prescriptions   Pending Prescriptions Disp Refills     amitriptyline (ELAVIL) 25 MG tablet 30 tablet 3     Sig: Take 1 tablet (25 mg) by mouth At Bedtime    There is no refill protocol information for this order

## 2018-01-30 PROBLEM — M25.552 HIP PAIN, LEFT: Status: RESOLVED | Noted: 2017-10-26 | Resolved: 2018-01-30

## 2018-01-30 NOTE — PROGRESS NOTES
Subjective:  HPI                    Objective:  System    Physical Exam    General     ROS    Assessment/Plan:    DISCHARGE REPORT    Progress reporting period is from 10.26 to 11.2.17    SUBJECTIVE  Subjective: pain is gone doing exs,    Current Pain level: 0/10   Initial Pain level: 6/10   Changes in function: Yes, see goal flow sheet for change in function   Adverse reactions: None;   ,     The subjective and objective information are from the last SOAP note on this patient.    OBJECTIVE  Objective: neg SI tests indep HEP,       ASSESSMENT/PLAN  Updated problem list and treatment plan: Diagnosis 1:  Hip pain     STG/LTGs have been met or progress has been made towards goals:  Yes (See Goal flow sheet completed today.)  Assessment of Progress: The patient has met all of their long term goals.  Self Management Plans:  Patient is independent in a home treatment program.  Patient is independent in self management of symptoms.    Makayla continues to require the following intervention to meet STG and LTG's:   We will discharge this patient from PT.    Recommendations:  This patient is ready to be discharged from therapy and continue their home treatment program.    Please refer to the daily flowsheet for treatment today, total treatment time and time spent performing 1:1 timed codes.

## 2018-02-06 ENCOUNTER — OFFICE VISIT (OUTPATIENT)
Dept: FAMILY MEDICINE | Facility: CLINIC | Age: 48
End: 2018-02-06
Payer: COMMERCIAL

## 2018-02-06 VITALS
HEART RATE: 108 BPM | WEIGHT: 181 LBS | OXYGEN SATURATION: 98 % | BODY MASS INDEX: 30.9 KG/M2 | TEMPERATURE: 98.5 F | HEIGHT: 64 IN | SYSTOLIC BLOOD PRESSURE: 123 MMHG | DIASTOLIC BLOOD PRESSURE: 83 MMHG

## 2018-02-06 DIAGNOSIS — R00.0 TACHYCARDIA: Primary | ICD-10-CM

## 2018-02-06 DIAGNOSIS — R51.9 CHRONIC NONINTRACTABLE HEADACHE, UNSPECIFIED HEADACHE TYPE: ICD-10-CM

## 2018-02-06 DIAGNOSIS — I10 ESSENTIAL HYPERTENSION WITH GOAL BLOOD PRESSURE LESS THAN 130/80: ICD-10-CM

## 2018-02-06 DIAGNOSIS — G89.29 CHRONIC NONINTRACTABLE HEADACHE, UNSPECIFIED HEADACHE TYPE: ICD-10-CM

## 2018-02-06 DIAGNOSIS — R05.9 COUGH: ICD-10-CM

## 2018-02-06 LAB
ALBUMIN SERPL-MCNC: 3.5 G/DL (ref 3.4–5)
ALP SERPL-CCNC: 69 U/L (ref 40–150)
ALT SERPL W P-5'-P-CCNC: 21 U/L (ref 0–50)
ANION GAP SERPL CALCULATED.3IONS-SCNC: 9 MMOL/L (ref 3–14)
AST SERPL W P-5'-P-CCNC: 12 U/L (ref 0–45)
BILIRUB SERPL-MCNC: 0.6 MG/DL (ref 0.2–1.3)
BUN SERPL-MCNC: 15 MG/DL (ref 7–30)
CALCIUM SERPL-MCNC: 8.6 MG/DL (ref 8.5–10.1)
CHLORIDE SERPL-SCNC: 105 MMOL/L (ref 94–109)
CO2 SERPL-SCNC: 22 MMOL/L (ref 20–32)
CREAT SERPL-MCNC: 0.71 MG/DL (ref 0.52–1.04)
ERYTHROCYTE [DISTWIDTH] IN BLOOD BY AUTOMATED COUNT: 13.8 % (ref 10–15)
GFR SERPL CREATININE-BSD FRML MDRD: 87 ML/MIN/1.7M2
GLUCOSE SERPL-MCNC: 78 MG/DL (ref 70–99)
HCT VFR BLD AUTO: 40.4 % (ref 35–47)
HGB BLD-MCNC: 14.2 G/DL (ref 11.7–15.7)
MCH RBC QN AUTO: 27.6 PG (ref 26.5–33)
MCHC RBC AUTO-ENTMCNC: 35.1 G/DL (ref 31.5–36.5)
MCV RBC AUTO: 79 FL (ref 78–100)
PLATELET # BLD AUTO: 342 10E9/L (ref 150–450)
POTASSIUM SERPL-SCNC: 3.9 MMOL/L (ref 3.4–5.3)
PROT SERPL-MCNC: 7.1 G/DL (ref 6.8–8.8)
RBC # BLD AUTO: 5.14 10E12/L (ref 3.8–5.2)
SODIUM SERPL-SCNC: 136 MMOL/L (ref 133–144)
TSH SERPL DL<=0.005 MIU/L-ACNC: 0.88 MU/L (ref 0.4–4)
WBC # BLD AUTO: 6 10E9/L (ref 4–11)

## 2018-02-06 PROCEDURE — 99000 SPECIMEN HANDLING OFFICE-LAB: CPT | Performed by: PHYSICIAN ASSISTANT

## 2018-02-06 PROCEDURE — 84443 ASSAY THYROID STIM HORMONE: CPT | Performed by: PHYSICIAN ASSISTANT

## 2018-02-06 PROCEDURE — 85027 COMPLETE CBC AUTOMATED: CPT | Performed by: PHYSICIAN ASSISTANT

## 2018-02-06 PROCEDURE — 99214 OFFICE O/P EST MOD 30 MIN: CPT | Performed by: PHYSICIAN ASSISTANT

## 2018-02-06 PROCEDURE — 86255 FLUORESCENT ANTIBODY SCREEN: CPT | Mod: 90 | Performed by: PHYSICIAN ASSISTANT

## 2018-02-06 PROCEDURE — 80053 COMPREHEN METABOLIC PANEL: CPT | Performed by: PHYSICIAN ASSISTANT

## 2018-02-06 PROCEDURE — 36415 COLL VENOUS BLD VENIPUNCTURE: CPT | Performed by: PHYSICIAN ASSISTANT

## 2018-02-06 RX ORDER — LOSARTAN POTASSIUM 25 MG/1
25 TABLET ORAL DAILY
Qty: 30 TABLET | Refills: 1 | Status: SHIPPED | OUTPATIENT
Start: 2018-02-06 | End: 2018-03-19

## 2018-02-06 NOTE — NURSING NOTE
"Chief Complaint   Patient presents with     Cough     x 1 month        Initial /83 (BP Location: Right arm, Patient Position: Sitting, Cuff Size: Adult Large)  Pulse 108  Temp 98.5  F (36.9  C) (Oral)  Ht 5' 4.25\" (1.632 m)  Wt 181 lb (82.1 kg)  LMP 02/28/2014  SpO2 98%  BMI 30.83 kg/m2 Estimated body mass index is 30.83 kg/(m^2) as calculated from the following:    Height as of this encounter: 5' 4.25\" (1.632 m).    Weight as of this encounter: 181 lb (82.1 kg).  Medication Reconciliation: complete  Joi Ivory MA    "

## 2018-02-06 NOTE — PROGRESS NOTES
SUBJECTIVE:   Makayla Diaz is a 47 year old female who presents to clinic today for the following health issues:        ENT Symptoms             Symptoms: cc Present Absent Comment   Fever/Chills   x    Fatigue   x    Muscle Aches   x    Eye Irritation   x    Sneezing   x    Nasal Glenn/Drg   x    Sinus Pressure/Pain   x    Loss of smell   x    Dental pain   x    Sore Throat   x    Swollen Glands   x    Ear Pain/Fullness   x    Cough  x     Wheeze   x    Chest Pain   x    Shortness of breath   x    Rash   x    Other         Symptom duration:  1 month    Symptom severity:  moderate   Treatments tried:  Sudafepj vasquez    Contacts:  none     Was seen a month ago and at that time had congestion and ear pain with cough.  Congestion and ear pain went away but cough continues.  Still has a choking sensation.  PND is gone and cough at night is worse.  Can't eat and talk at the same time or she will choke.  Will sometimes have a cough with talking.  Worse at work where it is dry.  No shortness of breath.  Also notes feeling dizzy at times.  Thinks from ears.  Turing head causes dizziness.  Sometimes has racing heart after the dizziness.   Dizziness comes first before palpitations. No shortness of breath.     Has some GERD but not as much as she has cough.    Does not smoke.            Problem list and histories reviewed & adjusted, as indicated.  Additional history: as documented    Patient Active Problem List   Diagnosis     CARDIOVASCULAR SCREENING; LDL GOAL LESS THAN 160     Obesity     Mastodynia     Fibroadenoma of breast     Neck pain     Headache     Neck pain on left side     Mid back pain on left side     ACP (advance care planning)     Essential hypertension with goal blood pressure less than 130/80     Past Surgical History:   Procedure Laterality Date     BIOPSY  2012    breast Bx left     BIOPSY  2011    right breast     COLONOSCOPY WITH CO2 INSUFFLATION N/A 8/5/2016    Procedure: COLONOSCOPY WITH CO2  "INSUFFLATION;  Surgeon: Duane, William Charles, MD;  Location: [facility]     ESOPHAGOSCOPY, GASTROSCOPY, DUODENOSCOPY (EGD), COMBINED N/A 12/11/2014    Procedure: COMBINED ESOPHAGOSCOPY, GASTROSCOPY, DUODENOSCOPY (EGD);  Surgeon: Jimi Waterman MD;  Location: [facility]     ESOPHAGOSCOPY, GASTROSCOPY, DUODENOSCOPY (EGD), COMBINED N/A 12/11/2014    Procedure: COMBINED ESOPHAGOSCOPY, GASTROSCOPY, DUODENOSCOPY (EGD), BIOPSY SINGLE OR MULTIPLE;  Surgeon: Jimi Waterman MD;  Location: [facility]     HYSTERECTOMY TOTAL ABDOMINAL  3/25/14    [place]      HYSTERECTOMY, PAP NO LONGER INDICATED  2014     TONSILLECTOMY   age 2     TUBAL LIGATION  2001       Social History   Substance Use Topics     Smoking status: Never Smoker     Smokeless tobacco: Never Used     Alcohol use Yes      Comment: 0-2 glasses wine monthly     Family History   Problem Relation Age of Onset     DIABETES Mother      HEART DISEASE Mother      age 48 had valve problems     Hypertension Mother      Neurologic Disorder Mother 18     migraine     Breast Cancer Maternal Grandmother      Thyroid Disease Sister      Hypertension Sister      Hypertension Brother            Reviewed and updated as needed this visit by clinical staff  Tobacco  Allergies  Meds  Med Hx  Surg Hx  Fam Hx  Soc Hx      Reviewed and updated as needed this visit by Provider         ROS:  As above    OBJECTIVE:     /83 (BP Location: Right arm, Patient Position: Sitting, Cuff Size: Adult Large)  Pulse 108  Temp 98.5  F (36.9  C) (Oral)  Ht 5' 4.25\" (1.632 m)  Wt 181 lb (82.1 kg)  LMP 02/28/2014  SpO2 98%  BMI 30.83 kg/m2  Body mass index is 30.83 kg/(m^2).  GENERAL: healthy, alert and no distress  HENT: ear canals and TM's normal, oropharynx clear and oral mucous membranes moist  NECK: no adenopathy, no asymmetry, masses, or scars and thyroid normal to palpation  RESP: lungs clear to auscultation - no rales, rhonchi or wheezes  CV: tachycardia, normal S1 S2, no S3 " or S4 and no murmur, click or rub        ASSESSMENT/PLAN:       1. Tachycardia  Unsure of cause.  May be from amitriptyline.  If labs normal can this continues could consider changing to nortriptyline or switching to a beta blocker    - CBC with platelets  - TSH with free T4 reflex  - Comprehensive metabolic panel (BMP + Alb, Alk Phos, ALT, AST, Total. Bili, TP)    2. Essential hypertension with goal blood pressure less than 130/80  At goal but with cough switch lisinopril to losartan.    - CBC with platelets  - TSH with free T4 reflex  - Comprehensive metabolic panel (BMP + Alb, Alk Phos, ALT, AST, Total. Bili, TP)  - losartan (COZAAR) 25 MG tablet; Take 1 tablet (25 mg) by mouth daily  Dispense: 30 tablet; Refill: 1    3. Cough  As above.  Also still consider PPI if cough continues.    - CBC with platelets  - TSH with free T4 reflex  - Comprehensive metabolic panel (BMP + Alb, Alk Phos, ALT, AST, Total. Bili, TP)  - losartan (COZAAR) 25 MG tablet; Take 1 tablet (25 mg) by mouth daily  Dispense: 30 tablet; Refill: 1  - Antineutrophil cytoplasmic Kimberlee IgG    4. Chronic nonintractable headache, unspecified headache type  As above.  Stable on amitiptyline but not sure if this the cause of tachycardia       FUTURE APPOINTMENTS:       - Follow-up visit in 2 weeks    Angelica Hills PA-C  [facility]

## 2018-02-06 NOTE — MR AVS SNAPSHOT
After Visit Summary   2/6/2018    Makayla Diaz    MRN: [id number]           Patient Information     Date Of Birth          1970        Visit Information        Provider Department      2/6/2018 12:40 PM Angelica Hills PA-C [facility]        Today's Diagnoses     Tachycardia    -  1    Essential hypertension with goal blood pressure less than 130/80        Cough          Care Instructions    Stop the lisinopril and start the losartan           Follow-ups after your visit        Follow-up notes from your care team     Return in about 2 weeks (around 2/20/2018) for BP Recheck.      Who to contact     If you have questions or need follow up information about today's clinic visit or your schedule please contact [facility] directly at [phone].  Normal or non-critical lab and imaging results will be communicated to you by MyChart, letter or phone within 4 business days after the clinic has received the results. If you do not hear from us within 7 days, please contact the clinic through MyChart or phone. If you have a critical or abnormal lab result, we will notify you by phone as soon as possible.  Submit refill requests through [employer] or call your pharmacy and they will forward the refill request to us. Please allow 3 business days for your refill to be completed.          Additional Information About Your Visit        MyChart Information     [employer] gives you secure access to your electronic health record. If you see a primary care provider, you can also send messages to your care team and make appointments. If you have questions, please call your primary care clinic.  If you do not have a primary care provider, please call [phone] and they will assist you.        Care EveryWhere ID     This is your Care EveryWhere ID. This could be used by other organizations to access your [place] medical records  [id number]        Your Vitals  "Were     Pulse Temperature Height Last Period Pulse Oximetry BMI (Body Mass Index)    108 98.5  F (36.9  C) (Oral) 5' 4.25\" (1.632 m) 02/28/2014 98% 30.83 kg/m2       Blood Pressure from Last 3 Encounters:   02/06/18 123/83   01/08/18 139/88   10/16/17 126/84    Weight from Last 3 Encounters:   02/06/18 181 lb (82.1 kg)   01/08/18 181 lb (82.1 kg)   10/16/17 177 lb (80.3 kg)              We Performed the Following     Antineutrophil cytoplasmic Kimberlee IgG     CBC with platelets     Comprehensive metabolic panel (BMP + Alb, Alk Phos, ALT, AST, Total. Bili, TP)     TSH with free T4 reflex          Today's Medication Changes          These changes are accurate as of 2/6/18  1:13 PM.  If you have any questions, ask your nurse or doctor.               Start taking these medicines.        Dose/Directions    losartan 25 MG tablet   Commonly known as:  COZAAR   Used for:  Essential hypertension with goal blood pressure less than 130/80, Cough   Started by:  Angelica Hills PA-C        Dose:  25 mg   Take 1 tablet (25 mg) by mouth daily   Quantity:  30 tablet   Refills:  1         Stop taking these medicines if you haven't already. Please contact your care team if you have questions.     lisinopril 2.5 MG tablet   Commonly known as:  PRINIVIL/Zestril   Stopped by:  Angelica Hills PA-C                Where to get your medicines      These medications were sent to [facility]/pharmacy #[id number] [address] [address]     Phone:  [phone]     losartan 25 MG tablet                Primary Care Provider Office Phone # Fax #    Angelica Hills PA-C [phone] [address] [facility] [address]        Equal Access to Services     THOMAS LANIER AH: Raeann Jenkins, giuliana alberto, qadavid kaalba alfonso. [facility] [phone].    ATENCIÓN: Si mirian artis " disposición servicios gratuitos de asistencia lingüística. Nataliia bowser [phone].    We comply with applicable federal civil rights laws and [place] laws. We do not discriminate on the basis of race, color, national origin, age, disability, sex, sexual orientation, or gender identity.            Thank you!     Thank you for choosing [facility]  for your care. Our goal is always to provide you with excellent care. Hearing back from our patients is one way we can continue to improve our services. Please take a few minutes to complete the written survey that you may receive in the mail after your visit with us. Thank you!             Your Updated Medication List - Protect others around you: Learn how to safely use, store and throw away your medicines at www.disposemymeds.org.          This list is accurate as of 2/6/18  1:13 PM.  Always use your most recent med list.                   Brand Name Dispense Instructions for use Diagnosis    amitriptyline 25 MG tablet    ELAVIL    90 tablet    Take 1 tablet (25 mg) by mouth At Bedtime    Chronic daily headache       losartan 25 MG tablet    COZAAR    30 tablet    Take 1 tablet (25 mg) by mouth daily    Essential hypertension with goal blood pressure less than 130/80, Cough

## 2018-02-07 LAB — ANCA IGG TITR SER IF: NORMAL {TITER}

## 2018-02-20 ENCOUNTER — OFFICE VISIT (OUTPATIENT)
Dept: FAMILY MEDICINE | Facility: CLINIC | Age: 48
End: 2018-02-20
Payer: COMMERCIAL

## 2018-02-20 VITALS
OXYGEN SATURATION: 97 % | TEMPERATURE: 98.1 F | HEART RATE: 109 BPM | BODY MASS INDEX: 30.66 KG/M2 | DIASTOLIC BLOOD PRESSURE: 86 MMHG | SYSTOLIC BLOOD PRESSURE: 130 MMHG | WEIGHT: 180 LBS

## 2018-02-20 DIAGNOSIS — R51.9 CHRONIC NONINTRACTABLE HEADACHE, UNSPECIFIED HEADACHE TYPE: ICD-10-CM

## 2018-02-20 DIAGNOSIS — R42 DIZZINESS: ICD-10-CM

## 2018-02-20 DIAGNOSIS — R00.0 TACHYCARDIA: ICD-10-CM

## 2018-02-20 DIAGNOSIS — I10 ESSENTIAL HYPERTENSION WITH GOAL BLOOD PRESSURE LESS THAN 130/80: Primary | ICD-10-CM

## 2018-02-20 DIAGNOSIS — G95.0 SYRINX OF SPINAL CORD (H): ICD-10-CM

## 2018-02-20 DIAGNOSIS — G89.29 CHRONIC NONINTRACTABLE HEADACHE, UNSPECIFIED HEADACHE TYPE: ICD-10-CM

## 2018-02-20 DIAGNOSIS — R25.1 TREMOR: ICD-10-CM

## 2018-02-20 PROCEDURE — 99214 OFFICE O/P EST MOD 30 MIN: CPT | Performed by: PHYSICIAN ASSISTANT

## 2018-02-20 NOTE — MR AVS SNAPSHOT
After Visit Summary   2/20/2018    Makayla Diaz    MRN: [id number]           Patient Information     Date Of Birth          1970        Visit Information        Provider Department      2/20/2018 2:20 PM Angelica Hills PA-C [facility]         Follow-ups after your visit        Who to contact     If you have questions or need follow up information about today's clinic visit or your schedule please contact [facility] directly at [phone].  Normal or non-critical lab and imaging results will be communicated to you by MyChart, letter or phone within 4 business days after the clinic has received the results. If you do not hear from us within 7 days, please contact the clinic through [employer] or phone. If you have a critical or abnormal lab result, we will notify you by phone as soon as possible.  Submit refill requests through [employer] or call your pharmacy and they will forward the refill request to us. Please allow 3 business days for your refill to be completed.          Additional Information About Your Visit        MyChart Information     [employer] gives you secure access to your electronic health record. If you see a primary care provider, you can also send messages to your care team and make appointments. If you have questions, please call your primary care clinic.  If you do not have a primary care provider, please call [phone] and they will assist you.        Care EveryWhere ID     This is your Care EveryWhere ID. This could be used by other organizations to access your [place] medical records  [id number]        Your Vitals Were     Pulse Temperature Last Period Pulse Oximetry BMI (Body Mass Index)       109 98.1  F (36.7  C) 02/28/2014 97% 30.66 kg/m2        Blood Pressure from Last 3 Encounters:   02/20/18 130/86   02/06/18 123/83   01/08/18 139/88    Weight from Last 3 Encounters:   02/20/18 180 lb (81.6 kg)   02/06/18 181  lb (82.1 kg)   01/08/18 181 lb (82.1 kg)              Today, you had the following     No orders found for display       Primary Care Provider Office Phone # Fax #    Angelica Hills PA-C [phone]       [address] [facility] [address]        Equal Access to Services     THOMAS LANIER : Hadii aad ku hadasho Soomaali, waaxda luqadaha, qaybta kaalmada adeegyada, waxay idiin hayaan adeeg kharash lamisaeln . So [facility] [phone].    ATENCIÓN: Si habla español, tiene a alex disposición servicios gratuitos de asistencia lingüística. Llame al [phone].    We comply with applicable federal civil rights laws and [place] laws. We do not discriminate on the basis of race, color, national origin, age, disability, sex, sexual orientation, or gender identity.            Thank you!     Thank you for choosing [facility]  for your care. Our goal is always to provide you with excellent care. Hearing back from our patients is one way we can continue to improve our services. Please take a few minutes to complete the written survey that you may receive in the mail after your visit with us. Thank you!             Your Updated Medication List - Protect others around you: Learn how to safely use, store and throw away your medicines at www.disposemymeds.org.          This list is accurate as of 2/20/18  2:54 PM.  Always use your most recent med list.                   Brand Name Dispense Instructions for use Diagnosis    amitriptyline 25 MG tablet    ELAVIL    90 tablet    Take 1 tablet (25 mg) by mouth At Bedtime    Chronic daily headache       losartan 25 MG tablet    COZAAR    30 tablet    Take 1 tablet (25 mg) by mouth daily    Essential hypertension with goal blood pressure less than 130/80, Cough
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
## 2018-02-21 PROBLEM — G95.0 SYRINX OF SPINAL CORD (H): Status: ACTIVE | Noted: 2018-02-21

## 2018-03-19 ENCOUNTER — OFFICE VISIT (OUTPATIENT)
Dept: FAMILY MEDICINE | Facility: CLINIC | Age: 48
End: 2018-03-19
Payer: COMMERCIAL

## 2018-03-19 VITALS
HEART RATE: 73 BPM | BODY MASS INDEX: 31 KG/M2 | SYSTOLIC BLOOD PRESSURE: 126 MMHG | DIASTOLIC BLOOD PRESSURE: 80 MMHG | TEMPERATURE: 97.9 F | WEIGHT: 182 LBS

## 2018-03-19 DIAGNOSIS — I10 ESSENTIAL HYPERTENSION WITH GOAL BLOOD PRESSURE LESS THAN 130/80: ICD-10-CM

## 2018-03-19 DIAGNOSIS — M67.432 GANGLION CYST OF WRIST, LEFT: ICD-10-CM

## 2018-03-19 DIAGNOSIS — R51.9 CHRONIC NONINTRACTABLE HEADACHE, UNSPECIFIED HEADACHE TYPE: ICD-10-CM

## 2018-03-19 DIAGNOSIS — R00.0 TACHYCARDIA: ICD-10-CM

## 2018-03-19 DIAGNOSIS — G89.29 CHRONIC NONINTRACTABLE HEADACHE, UNSPECIFIED HEADACHE TYPE: ICD-10-CM

## 2018-03-19 DIAGNOSIS — M79.642 PAIN OF LEFT HAND: Primary | ICD-10-CM

## 2018-03-19 PROCEDURE — 99214 OFFICE O/P EST MOD 30 MIN: CPT | Performed by: PHYSICIAN ASSISTANT

## 2018-03-19 RX ORDER — LOSARTAN POTASSIUM 25 MG/1
25 TABLET ORAL DAILY
Qty: 90 TABLET | Refills: 1 | Status: SHIPPED | OUTPATIENT
Start: 2018-03-19 | End: 2018-08-29

## 2018-03-19 RX ORDER — METOPROLOL SUCCINATE 25 MG/1
25 TABLET, EXTENDED RELEASE ORAL DAILY
Qty: 30 TABLET | Refills: 1 | Status: SHIPPED | OUTPATIENT
Start: 2018-03-19 | End: 2018-05-08

## 2018-03-19 NOTE — PROGRESS NOTES
SUBJECTIVE:   Makayla Diaz is a 48 year old female who presents to clinic today for the following health issues:        Medication Followup of Losartan -still having L wrist weakness     Taking Medication as prescribed: yes    Side Effects:  None    Medication Helping Symptoms:  yes     Had symptoms and tremor mostly resolved except achy in left hand and lump in wrist x 2 days.  No new changes with job.  Using cream on it and it helps.  Also started noticing a lump in wrist, it is not painful.  Is right handed.    Since stopping amitriptyline and lisinopril her cough has improved and her heart rate is normal.  She feels better.  Her headaches are still well controlled but the propanolol is expensive.          Problem list and histories reviewed & adjusted, as indicated.  Additional history: as documented    Patient Active Problem List   Diagnosis     CARDIOVASCULAR SCREENING; LDL GOAL LESS THAN 160     Obesity     Mastodynia     Fibroadenoma of breast     Neck pain     Chronic nonintractable headache     Neck pain on left side     Mid back pain on left side     ACP (advance care planning)     Essential hypertension with goal blood pressure less than 130/80     Syrinx of spinal cord (H)     Past Surgical History:   Procedure Laterality Date     BIOPSY  2012    breast Bx left     BIOPSY  2011    right breast     COLONOSCOPY WITH CO2 INSUFFLATION N/A 8/5/2016    Procedure: COLONOSCOPY WITH CO2 INSUFFLATION;  Surgeon: Duane, William Charles, MD;  [facility]: [facility]     ESOPHAGOSCOPY, GASTROSCOPY, DUODENOSCOPY (EGD), COMBINED N/A 12/11/2014    Procedure: COMBINED ESOPHAGOSCOPY, GASTROSCOPY, DUODENOSCOPY (EGD);  Surgeon: Jimi Waterman MD;  Location[facility]     ESOPHAGOSCOPY, GASTROSCOPY, DUODENOSCOPY (EGD), COMBINED N/A 12/11/2014    Procedure: COMBINED ESOPHAGOSCOPY, GASTROSCOPY, DUODENOSCOPY (EGD), BIOPSY SINGLE OR MULTIPLE;  Surgeon: Jimi Waterman MD;  Location: [facility]     HYSTERECTOMY TOTAL  ABDOMINAL  3/25/14    [place]      HYSTERECTOMY, PAP NO LONGER INDICATED  2014     TONSILLECTOMY   age 2     TUBAL LIGATION  2001       Social History   Substance Use Topics     Smoking status: Never Smoker     Smokeless tobacco: Never Used     Alcohol use Yes      Comment: 0-2 glasses wine monthly     Family History   Problem Relation Age of Onset     DIABETES Mother      HEART DISEASE Mother      age 48 had valve problems     Hypertension Mother      Neurologic Disorder Mother 18     migraine     Breast Cancer Maternal Grandmother      Thyroid Disease Sister      Hypertension Sister      Hypertension Brother            Reviewed and updated as needed this visit by clinical staff  Tobacco  Allergies  Meds  Med Hx  Surg Hx  Fam Hx  Soc Hx      Reviewed and updated as needed this visit by Provider  Meds         ROS:  As above    OBJECTIVE:     /80 (BP Location: Left arm, Patient Position: Sitting, Cuff Size: Adult Large)  Pulse 73  Temp 97.9  F (36.6  C) (Oral)  Wt 182 lb (82.6 kg)  LMP 02/28/2014  BMI 31 kg/m2  Body mass index is 31 kg/(m^2).  GENERAL: healthy, alert and no distress  RESP: lungs clear to auscultation - no rales, rhonchi or wheezes  CV: regular rates and rhythm, normal S1 S2, no S3 or S4 and no murmur, click or rub  MS: normal rom of both elbows, wrists and hands, no pain on palpation, normal strength, soft mass about 2cm over left wrist    Diagnostic Test Results:  none     ASSESSMENT/PLAN:       1. Essential hypertension with goal blood pressure less than 130/80  Stable.  refilled  - losartan (COZAAR) 25 MG tablet; Take 1 tablet (25 mg) by mouth daily  Dispense: 90 tablet; Refill: 1    2. Chronic nonintractable headache, unspecified headache type  Try metoprolol.  If working continue.  If not helping consider increasing dose.    - metoprolol succinate (TOPROL-XL) 25 MG 24 hr tablet; Take 1 tablet (25 mg) by mouth daily  Dispense: 30 tablet; Refill: 1    3. Tachycardia  Stable.   Likely due to amitriptyline.  refilled  - metoprolol succinate (TOPROL-XL) 25 MG 24 hr tablet; Take 1 tablet (25 mg) by mouth daily  Dispense: 30 tablet; Refill: 1    4. Pain of left hand  Wear brace.  If not helping see ortho  - order for DME; Equipment being ordered: wrist brace with thumb hole  Dispense: 1 each; Refill: 0    5. Ganglion cyst of wrist, left  Reassured pt.  If it becomes painful see ortho       FUTURE APPOINTMENTS:       - Follow-up visit in 3 months    Angelica Hills PA-C  [facility]

## 2018-03-19 NOTE — NURSING NOTE
"Chief Complaint   Patient presents with     Recheck Medication     Losartan        Initial /80 (BP Location: Left arm, Patient Position: Sitting, Cuff Size: Adult Large)  Pulse 73  Temp 97.9  F (36.6  C) (Oral)  Wt 182 lb (82.6 kg)  LMP 02/28/2014  BMI 31 kg/m2 Estimated body mass index is 31 kg/(m^2) as calculated from the following:    Height as of 2/6/18: 5' 4.25\" (1.632 m).    Weight as of this encounter: 182 lb (82.6 kg).  Medication Reconciliation: complete  Joi Ivory MA    "

## 2018-03-19 NOTE — PATIENT INSTRUCTIONS
Try metoprolol for headaches.  If not helping call or send me a message   Wear brace and try tylenol or ibuprofen at night

## 2018-03-19 NOTE — MR AVS SNAPSHOT
After Visit Summary   3/19/2018    Makayla Diaz    MRN: [id number]           Patient Information     Date Of Birth          1970        Visit Information        Provider Department      3/19/2018 1:40 PM Angelica Hills PA-C [facility]        Today's Diagnoses     Chronic nonintractable headache, unspecified headache type    -  1    Essential hypertension with goal blood pressure less than 130/80        Tachycardia        Pain of left hand        Ganglion cyst of wrist, left          Care Instructions    Try metoprolol for headaches.  If not helping call or send me a message   Wear brace and try tylenol or ibuprofen at night            Follow-ups after your visit        Follow-up notes from your care team     Return in about 3 months (around 6/19/2018) for headaches .      Who to contact     If you have questions or need follow up information about today's clinic visit or your schedule please contact [facility] directly at [phone].  Normal or non-critical lab and imaging results will be communicated to you by MyChart, letter or phone within 4 business days after the clinic has received the results. If you do not hear from us within 7 days, please contact the clinic through [employer] or phone. If you have a critical or abnormal lab result, we will notify you by phone as soon as possible.  Submit refill requests through [employer] or call your pharmacy and they will forward the refill request to us. Please allow 3 business days for your refill to be completed.          Additional Information About Your Visit        MyChart Information     [employer] gives you secure access to your electronic health record. If you see a primary care provider, you can also send messages to your care team and make appointments. If you have questions, please call your primary care clinic.  If you do not have a primary care provider, please call [phone] and  they will assist you.        Care EveryWhere ID     This is your Care EveryWhere ID. This could be used by other organizations to access your [facility] medical records  [id number]        Your Vitals Were     Pulse Temperature Last Period BMI (Body Mass Index)          73 97.9  F (36.6  C) (Oral) 02/28/2014 31 kg/m2         Blood Pressure from Last 3 Encounters:   03/19/18 126/80   02/20/18 130/86   02/06/18 123/83    Weight from Last 3 Encounters:   03/19/18 182 lb (82.6 kg)   02/20/18 180 lb (81.6 kg)   02/06/18 181 lb (82.1 kg)              Today, you had the following     No orders found for display         Today's Medication Changes          These changes are accurate as of 3/19/18  2:32 PM.  If you have any questions, ask your nurse or doctor.               Start taking these medicines.        Dose/Directions    metoprolol succinate 25 MG 24 hr tablet   Commonly known as:  TOPROL-XL   Used for:  Chronic nonintractable headache, unspecified headache type, Tachycardia   Started by:  Angelica Hills PA-C        Dose:  25 mg   Take 1 tablet (25 mg) by mouth daily   Quantity:  30 tablet   Refills:  1       order for DME   Used for:  Pain of left hand   Started by:  Angelica Hills PA-C        Equipment being ordered: wrist brace with thumb hole   Quantity:  1 each   Refills:  0         Stop taking these medicines if you haven't already. Please contact your care team if you have questions.     propranolol 80 MG 24 hr capsule   Commonly known as:  INDERAL LA   Stopped by:  Angelica Hills PA-C                Where to get your medicines      These medications were sent to [facility]/pharmacy #[id number] [place], [place] - [address] [address]     Phone:  [phone]     losartan 25 MG tablet    metoprolol succinate 25 MG 24 hr tablet         Some of these will need a paper prescription and others can be bought over the counter.  Ask your nurse if you have  questions.     Bring a paper prescription for each of these medications     order for DME                Primary Care Provider Office Phone # Fax #    Angelica Hills PA-C [phone]       [address] [facility] [address]        Equal Access to Services     THOMAS LANIER : Hadii aad ku hadbrittnio Soomaali, waaxda luqadaha, qaybta kaalmada adeegyada, waxay jacquelinein hayanantn ademaria del rosario khsonjalarry contreras. So [facility] [phone].    ATENCIÓN: Si habla español, tiene a alex disposición servicios gratuitos de asistencia lingüística. Llame al [phone].    We comply with applicable federal civil rights laws and [place] laws. We do not discriminate on the basis of race, color, national origin, age, disability, sex, sexual orientation, or gender identity.            Thank you!     Thank you for choosing [facility]  for your care. Our goal is always to provide you with excellent care. Hearing back from our patients is one way we can continue to improve our services. Please take a few minutes to complete the written survey that you may receive in the mail after your visit with us. Thank you!             Your Updated Medication List - Protect others around you: Learn how to safely use, store and throw away your medicines at www.disposemymeds.org.          This list is accurate as of 3/19/18  2:32 PM.  Always use your most recent med list.                   Brand Name Dispense Instructions for use Diagnosis    diphenhydrAMINE-acetaminophen  MG tablet    TYLENOL PM     Take 0.5 tablets by mouth nightly as needed for sleep        losartan 25 MG tablet    COZAAR    90 tablet    Take 1 tablet (25 mg) by mouth daily    Essential hypertension with goal blood pressure less than 130/80       metoprolol succinate 25 MG 24 hr tablet    TOPROL-XL    30 tablet    Take 1 tablet (25 mg) by mouth daily    Chronic nonintractable headache, unspecified headache type, Tachycardia       order for  DME     1 each    Equipment being ordered: wrist brace with thumb hole    Pain of left hand

## 2018-06-18 ENCOUNTER — TELEPHONE (OUTPATIENT)
Dept: FAMILY MEDICINE | Facility: CLINIC | Age: 48
End: 2018-06-18

## 2018-06-18 DIAGNOSIS — G89.29 CHRONIC NONINTRACTABLE HEADACHE, UNSPECIFIED HEADACHE TYPE: ICD-10-CM

## 2018-06-18 DIAGNOSIS — R00.0 TACHYCARDIA: ICD-10-CM

## 2018-06-18 DIAGNOSIS — R51.9 CHRONIC NONINTRACTABLE HEADACHE, UNSPECIFIED HEADACHE TYPE: ICD-10-CM

## 2018-06-18 NOTE — TELEPHONE ENCOUNTER
"Requested Prescriptions   Pending Prescriptions Disp Refills     metoprolol succinate (TOPROL-XL) 25 MG 24 hr tablet [Pharmacy Med Name: METOPROLOL SUCC ER 25 MG TAB] 30 tablet 1    Last Written Prescription Date:  5-14-18  Last Fill Quantity: 30,  # refills: 1   Last office visit: 3/19/2018 with prescribing provider:     Future Office Visit:     Sig: TAKE 1 TABLET (25 MG) BY MOUTH DAILY    Beta-Blockers Protocol Passed    6/18/2018 12:43 PM       Passed - Blood pressure under 140/90 in past 12 months    BP Readings from Last 3 Encounters:   03/19/18 126/80   02/20/18 130/86   02/06/18 123/83                Passed - Patient is age 6 or older       Passed - Recent (12 mo) or future (30 days) visit within the authorizing provider's specialty    Patient had office visit in the last 12 months or has a visit in the next 30 days with authorizing provider or within the authorizing provider's specialty.  See \"Patient Info\" tab in inbasket, or \"Choose Columns\" in Meds & Orders section of the refill encounter.              "

## 2018-06-19 RX ORDER — METOPROLOL SUCCINATE 25 MG/1
TABLET, EXTENDED RELEASE ORAL
Qty: 30 TABLET | Refills: 1 | OUTPATIENT
Start: 2018-06-19

## 2018-06-19 NOTE — TELEPHONE ENCOUNTER
"30 tabs with 1 refill metoprolol 25 mg was sent to requesting pharmacy by PCP on 5/14/18.    See plan at 3/19/18 visit:    FUTURE APPOINTMENTS:       - Follow-up visit in 3 months     Angelica Hills PA-C  [facility]        Spoke to pharmacy and verified patient just picked up 30 day supply yesterday; may disregard request.    \"Refusal\" routed back to pharmacy with note.    Encounter routed to team to reach out to patient to schedule follow up for headaches.      Yoanna Munguia RN  [facility]        "

## 2018-06-25 ENCOUNTER — OFFICE VISIT (OUTPATIENT)
Dept: FAMILY MEDICINE | Facility: CLINIC | Age: 48
End: 2018-06-25
Payer: COMMERCIAL

## 2018-06-25 VITALS
DIASTOLIC BLOOD PRESSURE: 80 MMHG | SYSTOLIC BLOOD PRESSURE: 139 MMHG | HEART RATE: 82 BPM | TEMPERATURE: 97.9 F | WEIGHT: 187 LBS | BODY MASS INDEX: 31.85 KG/M2 | OXYGEN SATURATION: 97 %

## 2018-06-25 DIAGNOSIS — I10 ESSENTIAL HYPERTENSION WITH GOAL BLOOD PRESSURE LESS THAN 130/80: ICD-10-CM

## 2018-06-25 DIAGNOSIS — R51.9 CHRONIC NONINTRACTABLE HEADACHE, UNSPECIFIED HEADACHE TYPE: ICD-10-CM

## 2018-06-25 DIAGNOSIS — M25.562 PAIN IN BOTH KNEES, UNSPECIFIED CHRONICITY: ICD-10-CM

## 2018-06-25 DIAGNOSIS — R00.0 TACHYCARDIA: ICD-10-CM

## 2018-06-25 DIAGNOSIS — G89.29 CHRONIC NONINTRACTABLE HEADACHE, UNSPECIFIED HEADACHE TYPE: ICD-10-CM

## 2018-06-25 DIAGNOSIS — G47.00 INSOMNIA, UNSPECIFIED TYPE: Primary | ICD-10-CM

## 2018-06-25 DIAGNOSIS — M25.561 PAIN IN BOTH KNEES, UNSPECIFIED CHRONICITY: ICD-10-CM

## 2018-06-25 PROCEDURE — 99214 OFFICE O/P EST MOD 30 MIN: CPT | Performed by: PHYSICIAN ASSISTANT

## 2018-06-25 RX ORDER — METOPROLOL SUCCINATE 25 MG/1
25 TABLET, EXTENDED RELEASE ORAL DAILY
Qty: 90 TABLET | Refills: 1 | Status: SHIPPED | OUTPATIENT
Start: 2018-06-25 | End: 2019-01-13

## 2018-06-25 NOTE — PROGRESS NOTES
SUBJECTIVE:   Makayla Diaz is a 48 year old female who presents to clinic today for the following health issues:        Medication Followup of  Losartan and metoprolol     Taking Medication as prescribed: yes    Side Effects:  None    Medication Helping Symptoms:  yes     Insomnia  Onset: 15 years     Description:   Time to fall asleep (sleep latency): 2 hours  Middle of night awakening:  YES  Early morning awakening:  YES    Progression of Symptoms:  same    Accompanying Signs & Symptoms:  Daytime sleepiness/napping: no   Excessive snoring/apnea: no   Restless legs: no   Frequent urination: no   Chronic pain:  no     History:  Prior Insomnia: YES    Precipitating factors:   New stressful situation: no   Caffeine intake: YES- sometimes   OTC decongestants: no   Any new medications: no     Alleviating factors:  Self medicating (alcohol, etc.):  no    Therapies Tried and outcome: tylenol pm-helps     Tylenol pm helps her sleep through the night unless she needs to urinate but can go right back to sleep.    Light sleeper.    Not sure why she is not falling asleep.     Not groggy in am with tylenol pm   Brother scared her about taking this so much  Has not tried just benadryl.  Only takes once a day. Avoid tylenol other times a day     metoprolol helps headaches     When exercising knees felt they were going to go out.  Straightening knees hurt.  Tried walking.  Walking twice a week.          Problem list and histories reviewed & adjusted, as indicated.  Additional history: as documented    Patient Active Problem List   Diagnosis     CARDIOVASCULAR SCREENING; LDL GOAL LESS THAN 160     Obesity     Mastodynia     Fibroadenoma of breast     Neck pain     Chronic nonintractable headache     Neck pain on left side     Mid back pain on left side     ACP (advance care planning)     Essential hypertension with goal blood pressure less than 130/80     Syrinx of spinal cord (H)     Past Surgical History:   Procedure  Laterality Date     BIOPSY  2012    breast Bx left     BIOPSY  2011    right breast     COLONOSCOPY WITH CO2 INSUFFLATION N/A 8/5/2016    Procedure: COLONOSCOPY WITH CO2 INSUFFLATION;  Surgeon: Duane, William Charles, MD;  Location: [facility]     ESOPHAGOSCOPY, GASTROSCOPY, DUODENOSCOPY (EGD), COMBINED N/A 12/11/2014    Procedure: COMBINED ESOPHAGOSCOPY, GASTROSCOPY, DUODENOSCOPY (EGD);  Surgeon: Jimi Waterman MD;  Location: [facility]     ESOPHAGOSCOPY, GASTROSCOPY, DUODENOSCOPY (EGD), COMBINED N/A 12/11/2014    Procedure: COMBINED ESOPHAGOSCOPY, GASTROSCOPY, DUODENOSCOPY (EGD), BIOPSY SINGLE OR MULTIPLE;  Surgeon: Jimi Waterman MD;  Location: [facility]     HYSTERECTOMY TOTAL ABDOMINAL  3/25/14    [place]      HYSTERECTOMY, PAP NO LONGER INDICATED  2014     TONSILLECTOMY   age 2     TUBAL LIGATION  2001       Social History   Substance Use Topics     Smoking status: Never Smoker     Smokeless tobacco: Never Used     Alcohol use Yes      Comment: 0-2 glasses wine monthly     Family History   Problem Relation Age of Onset     Diabetes Mother      HEART DISEASE Mother      age 48 had valve problems     Hypertension Mother      Neurologic Disorder Mother 18     migraine     Breast Cancer Maternal Grandmother      Thyroid Disease Sister      Hypertension Sister      Hypertension Brother            Reviewed and updated as needed this visit by clinical staff  Tobacco  Allergies  Meds  Med Hx  Surg Hx  Fam Hx  Soc Hx      Reviewed and updated as needed this visit by Provider  Allergies  Meds         ROS:  As above    OBJECTIVE:     /80  Pulse 82  Temp 97.9  F (36.6  C) (Oral)  Wt 187 lb (84.8 kg)  LMP 02/28/2014  SpO2 97%  BMI 31.85 kg/m2  Body mass index is 31.85 kg/(m^2).  GENERAL: alert, no distress and obese  RESP: lungs clear to auscultation - no rales, rhonchi or wheezes  CV: regular rates and rhythm, normal S1 S2, no S3 or S4, no murmur, click or rub and no peripheral edema    Diagnostic  Test Results:  none     ASSESSMENT/PLAN:     1. Insomnia, unspecified type  Discussed sleep hygiene and removing self from bed if not sleeping.  Turn clock around as well.  Ok to continue tylenol pm if uses only at night.  Can try just using benadryl.  Try to take some medication holidays.  Get a new mattress and pillow case cover to see if that helps with the nasal congestion.      2. Chronic nonintractable headache, unspecified headache type  Improved.  Continue current medications  - metoprolol succinate (TOPROL-XL) 25 MG 24 hr tablet; Take 1 tablet (25 mg) by mouth daily  Dispense: 90 tablet; Refill: 1    3. Essential hypertension with goal blood pressure less than 130/80  As above    4. Pain in both knees, unspecified chronicity  Encouraged pt try water aerobics again.  Weight loss will help    5. Tachycardia  Stable  - metoprolol succinate (TOPROL-XL) 25 MG 24 hr tablet; Take 1 tablet (25 mg) by mouth daily  Dispense: 90 tablet; Refill: 1    FUTURE APPOINTMENTS:       - Follow-up visit in 6 months    Angelica Hills PA-C  [facility]

## 2018-06-25 NOTE — MR AVS SNAPSHOT
After Visit Summary   6/25/2018    Makayla Diaz    MRN: [id number]           Patient Information     Date Of Birth          1970        Visit Information        Provider Department      6/25/2018 9:20 AM Angelica Hills PA-C [facility]        Today's Diagnoses     Insomnia, unspecified type    -  1    Chronic nonintractable headache, unspecified headache type        Essential hypertension with goal blood pressure less than 130/80        Pain in both knees, unspecified chronicity        Tachycardia          Care Instructions    50mg benadryl           Follow-ups after your visit        Follow-up notes from your care team     Return in about 6 months (around 12/25/2018) for blood pressure .      Who to contact     If you have questions or need follow up information about today's clinic visit or your schedule please contact [facility] directly at [phone].  Normal or non-critical lab and imaging results will be communicated to you by [employer], letter or phone within 4 business days after the clinic has received the results. If you do not hear from us within 7 days, please contact the clinic through [employer] or phone. If you have a critical or abnormal lab result, we will notify you by phone as soon as possible.  Submit refill requests through [employer] or call your pharmacy and they will forward the refill request to us. Please allow 3 business days for your refill to be completed.          Additional Information About Your Visit        MyChart Information     [employer] gives you secure access to your electronic health record. If you see a primary care provider, you can also send messages to your care team and make appointments. If you have questions, please call your primary care clinic.  If you do not have a primary care provider, please call [phone] and they will assist you.        Care EveryWhere ID     This is your Care EveryWhere  ID. This could be used by other organizations to access your [facility] medical records  [id number]        Your Vitals Were     Pulse Temperature Last Period Pulse Oximetry BMI (Body Mass Index)       82 97.9  F (36.6  C) (Oral) 02/28/2014 97% 31.85 kg/m2        Blood Pressure from Last 3 Encounters:   06/25/18 139/80   03/19/18 126/80   02/20/18 130/86    Weight from Last 3 Encounters:   06/25/18 187 lb (84.8 kg)   03/19/18 182 lb (82.6 kg)   02/20/18 180 lb (81.6 kg)              Today, you had the following     No orders found for display         Today's Medication Changes          These changes are accurate as of 6/25/18 10:17 AM.  If you have any questions, ask your nurse or doctor.               These medicines have changed or have updated prescriptions.        Dose/Directions    metoprolol succinate 25 MG 24 hr tablet   Commonly known as:  TOPROL-XL   This may have changed:  additional instructions   Used for:  Chronic nonintractable headache, unspecified headache type, Tachycardia   Changed by:  Angelica Hills PA-C        Dose:  25 mg   Take 1 tablet (25 mg) by mouth daily   Quantity:  90 tablet   Refills:  1            Where to get your medicines      These medications were sent to [facility]/pharmacy #[id number] [place] [id number]     Phone:  [phone]     metoprolol succinate 25 MG 24 hr tablet                Primary Care Provider Office Phone # Fax #    Angelica Hills PA-C [phone]       [address] [place] [address]        Equal Access to Services     [facility] : Hadii shane ku hadasho Soomaali, waaxda luqadaha, qaybta kaalmada pao, ba contreras. So [facility] [phone].    ATENCIÓN: Si habla español, tiene a alex disposición servicios gratuitos de asistencia lingüística. Nataliia al [phone].    We comply with applicable federal civil rights laws and [place] laws.  We do not discriminate on the basis of race, color, national origin, age, disability, sex, sexual orientation, or gender identity.            Thank you!     Thank you for choosing [facility]  for your care. Our goal is always to provide you with excellent care. Hearing back from our patients is one way we can continue to improve our services. Please take a few minutes to complete the written survey that you may receive in the mail after your visit with us. Thank you!             Your Updated Medication List - Protect others around you: Learn how to safely use, store and throw away your medicines at www.disposemymeds.org.          This list is accurate as of 6/25/18 10:17 AM.  Always use your most recent med list.                   Brand Name Dispense Instructions for use Diagnosis    diphenhydrAMINE-acetaminophen  MG tablet    TYLENOL PM     Take 0.5 tablets by mouth nightly as needed for sleep        losartan 25 MG tablet    COZAAR    90 tablet    Take 1 tablet (25 mg) by mouth daily    Essential hypertension with goal blood pressure less than 130/80       metoprolol succinate 25 MG 24 hr tablet    TOPROL-XL    90 tablet    Take 1 tablet (25 mg) by mouth daily    Chronic nonintractable headache, unspecified headache type, Tachycardia

## 2018-08-29 ENCOUNTER — OFFICE VISIT (OUTPATIENT)
Dept: FAMILY MEDICINE | Facility: CLINIC | Age: 48
End: 2018-08-29
Payer: COMMERCIAL

## 2018-08-29 VITALS
TEMPERATURE: 98.3 F | OXYGEN SATURATION: 98 % | WEIGHT: 189 LBS | DIASTOLIC BLOOD PRESSURE: 81 MMHG | BODY MASS INDEX: 32.19 KG/M2 | HEART RATE: 71 BPM | SYSTOLIC BLOOD PRESSURE: 125 MMHG

## 2018-08-29 DIAGNOSIS — K59.00 CONSTIPATION, UNSPECIFIED CONSTIPATION TYPE: Primary | ICD-10-CM

## 2018-08-29 DIAGNOSIS — I10 ESSENTIAL HYPERTENSION WITH GOAL BLOOD PRESSURE LESS THAN 130/80: ICD-10-CM

## 2018-08-29 DIAGNOSIS — R22.1 MASS OF NECK: ICD-10-CM

## 2018-08-29 PROCEDURE — 99214 OFFICE O/P EST MOD 30 MIN: CPT | Performed by: PHYSICIAN ASSISTANT

## 2018-08-29 RX ORDER — ASPIRIN 81 MG
100 TABLET, DELAYED RELEASE (ENTERIC COATED) ORAL DAILY
Qty: 60 TABLET | Refills: 1 | Status: SHIPPED | OUTPATIENT
Start: 2018-08-29 | End: 2020-05-05

## 2018-08-29 RX ORDER — LOSARTAN POTASSIUM 25 MG/1
25 TABLET ORAL DAILY
Qty: 90 TABLET | Refills: 1 | Status: SHIPPED | OUTPATIENT
Start: 2018-08-29 | End: 2019-02-16

## 2018-08-29 NOTE — MR AVS SNAPSHOT
After Visit Summary   8/29/2018    Makayla Diaz    MRN: [id number]           Patient Information     Date Of Birth          1970        Visit Information        Provider Department      8/29/2018 2:00 PM Angelica Hills PA-C [facility]        Today's Diagnoses     Constipation, unspecified constipation type    -  1    Mass of neck        Essential hypertension with goal blood pressure less than 130/80           Follow-ups after your visit        Future tests that were ordered for you today     Open Future Orders        Priority Expected Expires Ordered    US Head Neck Soft Tissue Routine  8/29/2019 8/29/2018            Who to contact     If you have questions or need follow up information about today's clinic visit or your schedule please contact [facility] directly at [phone].  Normal or non-critical lab and imaging results will be communicated to you by MyChart, letter or phone within 4 business days after the clinic has received the results. If you do not hear from us within 7 days, please contact the clinic through MyChart or phone. If you have a critical or abnormal lab result, we will notify you by phone as soon as possible.  Submit refill requests through [employer] or call your pharmacy and they will forward the refill request to us. Please allow 3 business days for your refill to be completed.          Additional Information About Your Visit        MyChart Information     [employer] gives you secure access to your electronic health record. If you see a primary care provider, you can also send messages to your care team and make appointments. If you have questions, please call your primary care clinic.  If you do not have a primary care provider, please call [phone] and they will assist you.        Care EveryWhere ID     This is your Care EveryWhere ID. This could be used by other organizations to access your [facility]  records  [id number]        Your Vitals Were     Pulse Temperature Last Period Pulse Oximetry BMI (Body Mass Index)       71 98.3  F (36.8  C) (Oral) 02/28/2014 98% 32.19 kg/m2        Blood Pressure from Last 3 Encounters:   08/29/18 125/81   06/25/18 139/80   03/19/18 126/80    Weight from Last 3 Encounters:   08/29/18 189 lb (85.7 kg)   06/25/18 187 lb (84.8 kg)   03/19/18 182 lb (82.6 kg)                 Today's Medication Changes          These changes are accurate as of 8/29/18  2:46 PM.  If you have any questions, ask your nurse or doctor.               Start taking these medicines.        Dose/Directions    docusate sodium 100 MG tablet   Commonly known as:  COLACE   Used for:  Constipation, unspecified constipation type   Started by:  Angelica Hills PA-C        Dose:  100 mg   Take 100 mg by mouth daily   Quantity:  60 tablet   Refills:  1            Where to get your medicines      These medications were sent to [facility]/pharmacy #[id number] - [place], [place] - [address] [address]     Phone:  [phone]     docusate sodium 100 MG tablet    losartan 25 MG tablet                Primary Care Provider Office Phone # Fax #    Angelica Hills PA-C [phone] [address] [facility] [address]        Equal Access to Services     THOMAS LANIER AH: Hadii shane rhoades hadasho Sodejonali, waaxda luqadaha, qaybta kaalmada adeegyada, waxana maria amrit contreras. So [facility] [phone].    ATENCIÓN: Si habla español, tiene a alex disposición servicios gratuitos de asistencia lingüística. Llame al [phone].    We comply with applicable federal civil rights laws and [place] laws. We do not discriminate on the basis of race, color, national origin, age, disability, sex, sexual orientation, or gender identity.            Thank you!     Thank you for choosing [facility]  for your care. Our goal is always to provide  you with excellent care. Hearing back from our patients is one way we can continue to improve our services. Please take a few minutes to complete the written survey that you may receive in the mail after your visit with us. Thank you!             Your Updated Medication List - Protect others around you: Learn how to safely use, store and throw away your medicines at www.disposemymeds.org.          This list is accurate as of 8/29/18  2:46 PM.  Always use your most recent med list.                   Brand Name Dispense Instructions for use Diagnosis    diphenhydrAMINE-acetaminophen  MG tablet    TYLENOL PM     Take 0.5 tablets by mouth nightly as needed for sleep        docusate sodium 100 MG tablet    COLACE    60 tablet    Take 100 mg by mouth daily    Constipation, unspecified constipation type       losartan 25 MG tablet    COZAAR    90 tablet    Take 1 tablet (25 mg) by mouth daily    Essential hypertension with goal blood pressure less than 130/80       metoprolol succinate 25 MG 24 hr tablet    TOPROL-XL    90 tablet    Take 1 tablet (25 mg) by mouth daily    Chronic nonintractable headache, unspecified headache type, Tachycardia

## 2018-08-29 NOTE — PROGRESS NOTES
SUBJECTIVE:   Makayla Diaz is a 48 year old female who presents to clinic today for the following health issues:      Check lump on L neck,getting bigger,not painful   Has had for 4 days.  No bite, no injury.  No new ENT concerns.  No headaches.  This is different than the lump she last last year in her neck.      Is constipated x one week.  Did travel 2 weeks ago.  Not usually constipated.      No weight changes or night sweats.          Problem list and histories reviewed & adjusted, as indicated.  Additional history: as documented    Patient Active Problem List   Diagnosis     CARDIOVASCULAR SCREENING; LDL GOAL LESS THAN 160     Obesity     Mastodynia     Fibroadenoma of breast     Neck pain     Chronic nonintractable headache     Neck pain on left side     Mid back pain on left side     ACP (advance care planning)     Essential hypertension with goal blood pressure less than 130/80     Syrinx of spinal cord (H)     Past Surgical History:   Procedure Laterality Date     BIOPSY  2012    breast Bx left     BIOPSY  2011    right breast     COLONOSCOPY WITH CO2 INSUFFLATION N/A 8/5/2016    Procedure: COLONOSCOPY WITH CO2 INSUFFLATION;  Surgeon: Duane, William Charles, MD;  Location: [facility] OR     ESOPHAGOSCOPY, GASTROSCOPY, DUODENOSCOPY (EGD), COMBINED N/A 12/11/2014    Procedure: COMBINED ESOPHAGOSCOPY, GASTROSCOPY, DUODENOSCOPY (EGD);  Surgeon: Jimi Waterman MD;  Location: [facility] OR     ESOPHAGOSCOPY, GASTROSCOPY, DUODENOSCOPY (EGD), COMBINED N/A 12/11/2014    Procedure: COMBINED ESOPHAGOSCOPY, GASTROSCOPY, DUODENOSCOPY (EGD), BIOPSY SINGLE OR MULTIPLE;  Surgeon: Jimi Waterman MD;  Location: [facility] OR     HYSTERECTOMY TOTAL ABDOMINAL  3/25/14    [facility]      HYSTERECTOMY, PAP NO LONGER INDICATED  2014     TONSILLECTOMY   age 2     TUBAL LIGATION  2001       Social History   Substance Use Topics     Smoking status: Never Smoker     Smokeless tobacco: Never Used     Alcohol use Yes      Comment: 0-2  glasses wine monthly     Family History   Problem Relation Age of Onset     Diabetes Mother      HEART DISEASE Mother      age 48 had valve problems     Hypertension Mother      Neurologic Disorder Mother 18     migraine     Breast Cancer Maternal Grandmother      Thyroid Disease Sister      Hypertension Sister      Hypertension Brother            Reviewed and updated as needed this visit by clinical staff  Tobacco  Allergies  Meds  Med Hx  Surg Hx  Fam Hx  Soc Hx      Reviewed and updated as needed this visit by Provider  Allergies  Meds         ROS:  As above    OBJECTIVE:     /81  Pulse 71  Temp 98.3  F (36.8  C) (Oral)  Wt 189 lb (85.7 kg)  LMP 02/28/2014  SpO2 98%  BMI 32.19 kg/m2  Body mass index is 32.19 kg/(m^2).  GENERAL: healthy, alert and no distress  NECK: no adenopathy, thyroid normal to palpation and fullness over left supraclavicular area, no lymphadenopathy in axilla   RESP: lungs clear to auscultation - no rales, rhonchi or wheezes  CV: regular rates and rhythm, normal S1 S2, no S3 or S4 and no murmur, click or rub  ABDOMEN: tenderness generalized and bowel sounds normal  PSYCH: mentation appears normal, affect normal/bright    Diagnostic Test Results:  none     ASSESSMENT/PLAN:       1. Constipation, unspecified constipation type  Due to travel most likely.  Follow up as needed  - docusate sodium (COLACE) 100 MG tablet; Take 100 mg by mouth daily  Dispense: 60 tablet; Refill: 1    2. Mass of neck  Follow up when results are back  - US Head Neck Soft Tissue; Future    3. Essential hypertension with goal blood pressure less than 130/80  At goal.  No changes.    - losartan (COZAAR) 25 MG tablet; Take 1 tablet (25 mg) by mouth daily  Dispense: 90 tablet; Refill: 1        Angelica Hills PA-C  [facility]

## 2018-08-31 ENCOUNTER — RADIANT APPOINTMENT (OUTPATIENT)
Dept: ULTRASOUND IMAGING | Facility: CLINIC | Age: 48
End: 2018-08-31
Attending: PHYSICIAN ASSISTANT
Payer: COMMERCIAL

## 2018-08-31 DIAGNOSIS — R22.1 MASS OF NECK: ICD-10-CM

## 2018-08-31 PROCEDURE — 76536 US EXAM OF HEAD AND NECK: CPT

## 2018-08-31 NOTE — PROGRESS NOTES
Makayla,     Your thyroid ultrasound was normal without any identified masses. There is a small thyroid nodule but does not likely need follow up. Marley is out of the clinic until Tuesday but she will contact you with a further follow up plan.   Maryellen Rhodes PA-C

## 2019-01-13 DIAGNOSIS — G89.29 CHRONIC NONINTRACTABLE HEADACHE, UNSPECIFIED HEADACHE TYPE: ICD-10-CM

## 2019-01-13 DIAGNOSIS — R00.0 TACHYCARDIA: ICD-10-CM

## 2019-01-13 DIAGNOSIS — R51.9 CHRONIC NONINTRACTABLE HEADACHE, UNSPECIFIED HEADACHE TYPE: ICD-10-CM

## 2019-01-14 RX ORDER — METOPROLOL SUCCINATE 25 MG/1
TABLET, EXTENDED RELEASE ORAL
Qty: 90 TABLET | Refills: 1 | Status: SHIPPED | OUTPATIENT
Start: 2019-01-14 | End: 2019-02-16

## 2019-01-14 NOTE — TELEPHONE ENCOUNTER
"Requested Prescriptions   Pending Prescriptions Disp Refills     metoprolol succinate ER (TOPROL-XL) 25 MG 24 hr tablet [Pharmacy Med Name: METOPROLOL SUCC ER 25 MG TAB] 90 tablet 1    Last Written Prescription Date:  6/25/18  Last Fill Quantity: 90,  # refills: 1   Last office visit: 8/29/2018 with prescribing provider:     Future Office Visit:     Sig: TAKE 1 TABLET BY MOUTH EVERY DAY    Beta-Blockers Protocol Passed - 1/13/2019 10:23 AM       Passed - Blood pressure under 140/90 in past 12 months    BP Readings from Last 3 Encounters:   08/29/18 125/81   06/25/18 139/80   03/19/18 126/80                Passed - Patient is age 6 or older       Passed - Recent (12 mo) or future (30 days) visit within the authorizing provider's specialty    Patient had office visit in the last 12 months or has a visit in the next 30 days with authorizing provider or within the authorizing provider's specialty.  See \"Patient Info\" tab in inbasket, or \"Choose Columns\" in Meds & Orders section of the refill encounter.             Passed - Medication is active on med list          "

## 2019-01-14 NOTE — TELEPHONE ENCOUNTER
Prescription approved per [facility] Refill Protocol.  HTN was addressed at 8/29/18 visit.     Jesusita Santo RN

## 2019-02-16 ENCOUNTER — MYC REFILL (OUTPATIENT)
Dept: FAMILY MEDICINE | Facility: CLINIC | Age: 49
End: 2019-02-16

## 2019-02-16 DIAGNOSIS — G89.29 CHRONIC NONINTRACTABLE HEADACHE, UNSPECIFIED HEADACHE TYPE: ICD-10-CM

## 2019-02-16 DIAGNOSIS — I10 ESSENTIAL HYPERTENSION WITH GOAL BLOOD PRESSURE LESS THAN 130/80: ICD-10-CM

## 2019-02-16 DIAGNOSIS — R51.9 CHRONIC NONINTRACTABLE HEADACHE, UNSPECIFIED HEADACHE TYPE: ICD-10-CM

## 2019-02-16 DIAGNOSIS — R00.0 TACHYCARDIA: ICD-10-CM

## 2019-02-18 RX ORDER — METOPROLOL SUCCINATE 25 MG/1
25 TABLET, EXTENDED RELEASE ORAL DAILY
Qty: 90 TABLET | Refills: 0 | Status: SHIPPED | OUTPATIENT
Start: 2019-02-18 | End: 2019-07-16

## 2019-02-18 RX ORDER — LOSARTAN POTASSIUM 25 MG/1
25 TABLET ORAL DAILY
Qty: 90 TABLET | Refills: 1 | OUTPATIENT
Start: 2019-02-18

## 2019-02-18 RX ORDER — LOSARTAN POTASSIUM 25 MG/1
25 TABLET ORAL DAILY
Qty: 90 TABLET | Refills: 0 | Status: SHIPPED | OUTPATIENT
Start: 2019-02-18 | End: 2019-07-16

## 2019-02-18 NOTE — TELEPHONE ENCOUNTER
"Requested Prescriptions   Pending Prescriptions Disp Refills     losartan (COZAAR) 25 MG tablet 90 tablet 1     Sig: Take 1 tablet (25 mg) by mouth daily    Angiotensin-II Receptors Failed - 2/16/2019  7:52 PM       Failed - Normal serum creatinine on file in past 12 months    Recent Labs   Lab Test 02/06/18  1329   CR 0.71            Failed - Normal serum potassium on file in past 12 months    Recent Labs   Lab Test 02/06/18  1329   POTASSIUM 3.9                   Passed - Blood pressure under 140/90 in past 12 months    BP Readings from Last 3 Encounters:   08/29/18 125/81   06/25/18 139/80   03/19/18 126/80                Passed - Recent (12 mo) or future (30 days) visit within the authorizing provider's specialty    Patient had office visit in the last 12 months or has a visit in the next 30 days with authorizing provider or within the authorizing provider's specialty.  See \"Patient Info\" tab in inbasket, or \"Choose Columns\" in Meds & Orders section of the refill encounter.             Passed - Medication is active on med list       Passed - Patient is age 18 or older       Passed - No active pregnancy on record       Passed - No positive pregnancy test in past 12 months        metoprolol succinate ER (TOPROL-XL) 25 MG 24 hr tablet 90 tablet 1     Sig: Take 1 tablet (25 mg) by mouth daily    Beta-Blockers Protocol Passed - 2/16/2019  7:52 PM       Passed - Blood pressure under 140/90 in past 12 months    BP Readings from Last 3 Encounters:   08/29/18 125/81   06/25/18 139/80   03/19/18 126/80                Passed - Patient is age 6 or older       Passed - Recent (12 mo) or future (30 days) visit within the authorizing provider's specialty    Patient had office visit in the last 12 months or has a visit in the next 30 days with authorizing provider or within the authorizing provider's specialty.  See \"Patient Info\" tab in inbasket, or \"Choose Columns\" in Meds & Orders section of the refill encounter.             " Passed - Medication is active on med list

## 2019-02-18 NOTE — TELEPHONE ENCOUNTER
Routing refill request to provider for review/approval because:  Labs not current:  Creatinine and potassium     Jesusita Santo RN

## 2019-02-18 NOTE — TELEPHONE ENCOUNTER
Sent my chart reply relaying below information from provider.     Adalgisa Willett RN  [facility]

## 2019-02-19 NOTE — TELEPHONE ENCOUNTER
Patient has read the my chart message. No further action necessary.    Adalgisa Willett RN  [facility]

## 2019-07-16 ENCOUNTER — TELEPHONE (OUTPATIENT)
Dept: FAMILY MEDICINE | Facility: CLINIC | Age: 49
End: 2019-07-16

## 2019-07-16 ENCOUNTER — OFFICE VISIT (OUTPATIENT)
Dept: FAMILY MEDICINE | Facility: CLINIC | Age: 49
End: 2019-07-16
Payer: COMMERCIAL

## 2019-07-16 VITALS
WEIGHT: 178 LBS | OXYGEN SATURATION: 98 % | BODY MASS INDEX: 29.66 KG/M2 | SYSTOLIC BLOOD PRESSURE: 112 MMHG | HEART RATE: 70 BPM | HEIGHT: 65 IN | TEMPERATURE: 98.4 F | DIASTOLIC BLOOD PRESSURE: 76 MMHG

## 2019-07-16 DIAGNOSIS — R21 RASH: ICD-10-CM

## 2019-07-16 DIAGNOSIS — R00.0 TACHYCARDIA: ICD-10-CM

## 2019-07-16 DIAGNOSIS — R26.89 LOSS OF BALANCE: ICD-10-CM

## 2019-07-16 DIAGNOSIS — Z00.00 ROUTINE GENERAL MEDICAL EXAMINATION AT A HEALTH CARE FACILITY: Primary | ICD-10-CM

## 2019-07-16 DIAGNOSIS — G89.29 CHRONIC NONINTRACTABLE HEADACHE, UNSPECIFIED HEADACHE TYPE: ICD-10-CM

## 2019-07-16 DIAGNOSIS — R21 RASH: Primary | ICD-10-CM

## 2019-07-16 DIAGNOSIS — G47.00 INSOMNIA, UNSPECIFIED TYPE: ICD-10-CM

## 2019-07-16 DIAGNOSIS — I10 ESSENTIAL HYPERTENSION WITH GOAL BLOOD PRESSURE LESS THAN 130/80: ICD-10-CM

## 2019-07-16 DIAGNOSIS — R51.9 CHRONIC NONINTRACTABLE HEADACHE, UNSPECIFIED HEADACHE TYPE: ICD-10-CM

## 2019-07-16 LAB
ALBUMIN SERPL-MCNC: 3.7 G/DL (ref 3.4–5)
ALP SERPL-CCNC: 73 U/L (ref 40–150)
ALT SERPL W P-5'-P-CCNC: 24 U/L (ref 0–50)
ANION GAP SERPL CALCULATED.3IONS-SCNC: 6 MMOL/L (ref 3–14)
AST SERPL W P-5'-P-CCNC: 15 U/L (ref 0–45)
BILIRUB SERPL-MCNC: 0.8 MG/DL (ref 0.2–1.3)
BUN SERPL-MCNC: 11 MG/DL (ref 7–30)
CALCIUM SERPL-MCNC: 8.6 MG/DL (ref 8.5–10.1)
CHLORIDE SERPL-SCNC: 105 MMOL/L (ref 94–109)
CHOLEST SERPL-MCNC: 246 MG/DL
CO2 SERPL-SCNC: 25 MMOL/L (ref 20–32)
CREAT SERPL-MCNC: 0.67 MG/DL (ref 0.52–1.04)
ERYTHROCYTE [DISTWIDTH] IN BLOOD BY AUTOMATED COUNT: 13.6 % (ref 10–15)
GFR SERPL CREATININE-BSD FRML MDRD: >90 ML/MIN/{1.73_M2}
GLUCOSE SERPL-MCNC: 89 MG/DL (ref 70–99)
HCT VFR BLD AUTO: 40.2 % (ref 35–47)
HDLC SERPL-MCNC: 50 MG/DL
HGB BLD-MCNC: 14.1 G/DL (ref 11.7–15.7)
LDLC SERPL CALC-MCNC: 179 MG/DL
MCH RBC QN AUTO: 27.5 PG (ref 26.5–33)
MCHC RBC AUTO-ENTMCNC: 35.1 G/DL (ref 31.5–36.5)
MCV RBC AUTO: 79 FL (ref 78–100)
NONHDLC SERPL-MCNC: 196 MG/DL
PLATELET # BLD AUTO: 345 10E9/L (ref 150–450)
POTASSIUM SERPL-SCNC: 4.1 MMOL/L (ref 3.4–5.3)
PROT SERPL-MCNC: 7 G/DL (ref 6.8–8.8)
RBC # BLD AUTO: 5.12 10E12/L (ref 3.8–5.2)
SODIUM SERPL-SCNC: 136 MMOL/L (ref 133–144)
TRIGL SERPL-MCNC: 83 MG/DL
WBC # BLD AUTO: 4.6 10E9/L (ref 4–11)

## 2019-07-16 PROCEDURE — 80053 COMPREHEN METABOLIC PANEL: CPT | Performed by: PHYSICIAN ASSISTANT

## 2019-07-16 PROCEDURE — 85027 COMPLETE CBC AUTOMATED: CPT | Performed by: PHYSICIAN ASSISTANT

## 2019-07-16 PROCEDURE — 99000 SPECIMEN HANDLING OFFICE-LAB: CPT | Performed by: PHYSICIAN ASSISTANT

## 2019-07-16 PROCEDURE — 80061 LIPID PANEL: CPT | Performed by: PHYSICIAN ASSISTANT

## 2019-07-16 PROCEDURE — 99396 PREV VISIT EST AGE 40-64: CPT | Performed by: PHYSICIAN ASSISTANT

## 2019-07-16 PROCEDURE — 99213 OFFICE O/P EST LOW 20 MIN: CPT | Mod: 25 | Performed by: PHYSICIAN ASSISTANT

## 2019-07-16 PROCEDURE — 80323 ALKALOIDS NOS: CPT | Mod: 90 | Performed by: PHYSICIAN ASSISTANT

## 2019-07-16 PROCEDURE — 36415 COLL VENOUS BLD VENIPUNCTURE: CPT | Performed by: PHYSICIAN ASSISTANT

## 2019-07-16 RX ORDER — KETOCONAZOLE 20 MG/G
CREAM TOPICAL DAILY
Qty: 60 G | Refills: 0 | Status: SHIPPED | OUTPATIENT
Start: 2019-07-16 | End: 2019-07-16

## 2019-07-16 RX ORDER — METOPROLOL SUCCINATE 25 MG/1
25 TABLET, EXTENDED RELEASE ORAL DAILY
Qty: 90 TABLET | Refills: 1 | Status: SHIPPED | OUTPATIENT
Start: 2019-07-16 | End: 2020-01-20

## 2019-07-16 RX ORDER — LOSARTAN POTASSIUM 25 MG/1
25 TABLET ORAL DAILY
Qty: 90 TABLET | Refills: 1 | Status: SHIPPED | OUTPATIENT
Start: 2019-07-16 | End: 2020-01-20

## 2019-07-16 ASSESSMENT — MIFFLIN-ST. JEOR: SCORE: 1425.34

## 2019-07-16 NOTE — PROGRESS NOTES
SUBJECTIVE:   CC: Makayla Diaz is an 49 year old woman who presents for preventive health visit.     Healthy Habits:    Do you get at least three servings of calcium containing foods daily (dairy, green leafy vegetables, etc.)? yes    Amount of exercise or daily activities, outside of work: no    Problems taking medications regularly No    Medication side effects: No    Have you had an eye exam in the past two years? yes    Do you see a dentist twice per year? yes    Do you have sleep apnea, excessive snoring or daytime drowsiness?no    Feeling off balanace x 3 weeks.  Only with walking.  Was doing a keto diet before this started.  Has stopped now.  Happens multiple times daily.  Has not fallen.  Each episode last less than one min.    Has daily headaches-mild pain on top of head that is her normal headache.  Water helps.    No vision changes.  Does some times get tunnel vision with this.  No palpitations.  Does make her nauseated.    Recently has started more with tuning corners.    Not related to feeling hungry.      Can't fall asleep.  Has to take tylenol pm to sleep.  Is a light sleeper.  This is long standing.  Does sleep walk sometimes too-has done this for years as well.  She is interested in seeing a sleep clinic for this.  Worried about taking tylenol pm nightly.  Keeps a good sleep hygiene           Today's PHQ-2 Score:   PHQ-2 ( 1999 [employer]) 7/16/2019 8/29/2018   Q1: Little interest or pleasure in doing things 0 0   Q2: Feeling down, depressed or hopeless 0 0   PHQ-2 Score 0 0   Q1: Little interest or pleasure in doing things - -   Q2: Feeling down, depressed or hopeless - -   PHQ-2 Score - -       Abuse: Current or Past(Physical, Sexual or Emotional)- No  Do you feel safe in your environment? Yes    Social History     Tobacco Use     Smoking status: Never Smoker     Smokeless tobacco: Never Used   Substance Use Topics     Alcohol use: Yes     Comment: 0-2 glasses wine monthly                      "   Reviewed orders with patient.  Reviewed health maintenance and updated orders accordingly - Yes  Lab work is in process    Mammogram Screening: Patient under age 50, mutual decision reflected in health maintenance.      Pertinent mammograms are reviewed under the imaging tab.  History of abnormal Pap smear: Status post benign hysterectomy. Health Maintenance and Surgical History updated.  PAP / HPV 10/21/2013 2/9/2012 3/2/2010   PAP NIL NIL NIL     Reviewed and updated as needed this visit by clinical staff  Tobacco  Allergies  Meds  Med Hx  Surg Hx  Fam Hx  Soc Hx        Reviewed and updated as needed this visit by Provider  Allergies  Meds            ROS:  CONSTITUTIONAL: NEGATIVE for fever, chills, change in weight  INTEGUMENTARY/SKIN: red bump on left arm x several weeks.  Itches   EYES: as above  ENT: NEGATIVE for ear, mouth and throat problems  RESP: NEGATIVE for significant cough or SOB  BREAST: no acute changes, always lumpy and painful   CV: NEGATIVE for chest pain, palpitations or peripheral edema  GI:some stomach pain and bloating after eating on the right.  Also had an injury to that area 6 months ago when her granddaughter jumped on her.      : NEGATIVE for unusual urinary or vaginal symptoms. No vaginal bleeding.  MUSCULOSKELETAL: NEGATIVE for significant arthralgias or myalgia  NEURO: as above  PSYCHIATRIC: NEGATIVE for changes in mood or affect       OBJECTIVE:   /76   Pulse 70   Temp 98.4  F (36.9  C) (Oral)   Ht 1.638 m (5' 4.5\")   Wt 80.7 kg (178 lb)   LMP 02/28/2014   SpO2 98%   BMI 30.08 kg/m     Waist 37.5in   EXAM:  GENERAL: healthy, alert and no distress  EYES: Eyes grossly normal to inspection, PERRL and conjunctivae and sclerae normal  HENT: ear canals and TM's normal, oropharynx clear and oral mucous membranes moist  NECK: no adenopathy, no asymmetry, masses, or scars and thyroid normal to palpation  RESP: lungs clear to auscultation - no rales, rhonchi or " wheezes  BREAST: normal without masses, tenderness or nipple discharge and no palpable axillary masses or adenopathy  CV: regular rates and rhythm, normal S1 S2, no S3 or S4, no murmur, click or rub, peripheral pulses strong, no peripheral edema and no bruits heard carotids   ABDOMEN: tenderness RUQ and bowel sounds normal  MS: no gross musculoskeletal defects noted, no edema  SKIN: small red papule on left arm   NEURO: Normal strength and tone, mentation intact, cranial nerves 2-12 intact, DTR's normal and symmetric knees and Romberg normal  PSYCH: mentation appears normal, affect normal/bright    Diagnostic Test Results:  Labs reviewed in Epic    ASSESSMENT/PLAN:   1. Routine general medical examination at a health care facility    - Lipid panel reflex to direct LDL Fasting  - MA Screening Digital Bilateral; Future  - Comprehensive metabolic panel  - CBC with platelets  - OPTOMETRY REFERRAL  - Nicotine and Cotinine Blood    2. Essential hypertension with goal blood pressure less than 130/80  At goal. No changes  - losartan (COZAAR) 25 MG tablet; Take 1 tablet (25 mg) by mouth daily  Dispense: 90 tablet; Refill: 1    3. Chronic nonintractable headache, unspecified headache type  As above  - metoprolol succinate ER (TOPROL-XL) 25 MG 24 hr tablet; Take 1 tablet (25 mg) by mouth daily  Dispense: 90 tablet; Refill: 1    4. Tachycardia  Stable now on medications.  refilled  - metoprolol succinate ER (TOPROL-XL) 25 MG 24 hr tablet; Take 1 tablet (25 mg) by mouth daily  Dispense: 90 tablet; Refill: 1    5. Insomnia, unspecified type    - SLEEP EVALUATION & MANAGEMENT REFERRAL - ADULT -[facility] [place]  [phone] (Age 15 and up); Future    6. Loss of balance  encouraged eye exam and if all normal consider carotid ultrasound and neurology referral   - Comprehensive metabolic panel  - CBC with platelets  - OPTOMETRY REFERRAL    7. Rash  Looks like bug bite.  Try triamcinolone cream and follow up as  "needed.  Wrong cream sent to pharmacy.  New prescription done and pt notified after the office visit.      COUNSELING:   Reviewed preventive health counseling, as reflected in patient instructions       Regular exercise       Healthy diet/nutrition    Estimated body mass index is 30.08 kg/m  as calculated from the following:    Height as of this encounter: 1.638 m (5' 4.5\").    Weight as of this encounter: 80.7 kg (178 lb).         reports that she has never smoked. She has never used smokeless tobacco.      Counseling Resources:  ATP IV Guidelines  Pooled Cohorts Equation Calculator  Breast Cancer Risk Calculator  FRAX Risk Assessment  ICSI Preventive Guidelines  Dietary Guidelines for Americans, 2010  USDA's MyPlate  ASA Prophylaxis  Lung CA Screening    Angelica Hills PA-C  [facility]  "

## 2019-07-16 NOTE — TELEPHONE ENCOUNTER
Please call pt.  I sent the wrong cream to the pharmacy for her.  I can send the correct one for her, does she want it to a different pharmacy.  Please tell her I am sorry, tell her it is my Monday!    Angelica Hills PA-C

## 2019-07-22 LAB
COTININE SERPL-MCNC: NORMAL NG/ML
NICOTINE SERPL-MCNC: NORMAL NG/ML

## 2019-07-22 RX ORDER — TRIAMCINOLONE ACETONIDE 5 MG/G
OINTMENT TOPICAL
Qty: 15 G | Refills: 1 | Status: SHIPPED | OUTPATIENT
Start: 2019-07-22 | End: 2020-05-05

## 2019-07-22 NOTE — TELEPHONE ENCOUNTER
Called patient.  She stated that she would like the correct cream sent to the pharmacy that is T'd up.    Susan Castellanos RN [facility] Triage.

## 2019-07-26 ENCOUNTER — OFFICE VISIT (OUTPATIENT)
Dept: OPHTHALMOLOGY | Facility: CLINIC | Age: 49
End: 2019-07-26
Payer: COMMERCIAL

## 2019-07-26 DIAGNOSIS — H52.13 MYOPIA OF BOTH EYES: Primary | ICD-10-CM

## 2019-07-26 PROCEDURE — 92015 DETERMINE REFRACTIVE STATE: CPT | Performed by: STUDENT IN AN ORGANIZED HEALTH CARE EDUCATION/TRAINING PROGRAM

## 2019-07-26 PROCEDURE — 92004 COMPRE OPH EXAM NEW PT 1/>: CPT | Performed by: STUDENT IN AN ORGANIZED HEALTH CARE EDUCATION/TRAINING PROGRAM

## 2019-07-26 ASSESSMENT — REFRACTION_MANIFEST
OS_CYLINDER: +0.75
OD_AXIS: 084
OS_SPHERE: -0.50
OS_ADD: +2.25
OD_CYLINDER: +0.75
OD_SPHERE: -0.75
OD_ADD: +2.25
OS_AXIS: 111

## 2019-07-26 ASSESSMENT — EXTERNAL EXAM - LEFT EYE: OS_EXAM: NORMAL

## 2019-07-26 ASSESSMENT — CONF VISUAL FIELD
OS_NORMAL: 1
OD_NORMAL: 1

## 2019-07-26 ASSESSMENT — VISUAL ACUITY
OD_SC: 20/20
METHOD: SNELLEN - LINEAR
OS_SC+: -1
OD_SC+: -2
OS_SC: 20/25

## 2019-07-26 ASSESSMENT — TONOMETRY
OD_IOP_MMHG: 13
OS_IOP_MMHG: 13
IOP_METHOD: APPLANATION

## 2019-07-26 ASSESSMENT — EXTERNAL EXAM - RIGHT EYE: OD_EXAM: NORMAL

## 2019-07-26 ASSESSMENT — CUP TO DISC RATIO
OS_RATIO: 0.4
OD_RATIO: 0.35

## 2019-07-26 ASSESSMENT — SLIT LAMP EXAM - LIDS
COMMENTS: NORMAL
COMMENTS: NORMAL

## 2019-07-26 NOTE — PROGRESS NOTES
Current Eye Medications:  None     Subjective: Referred by  Angelica Hills PA-C for eye exam due to loss of balance problems. PT notes that she feels like the room tilts and happens multiple times daily x 3 years.  PT notes she doesn't wear glasses full time and mostly wears them for reading and night driving.     FHx: glaucoma-mother. No previous eye injuries or surgeries.      Objective:  See Ophthalmology Exam.      Assessment:  Makayla Diaz is a 49 year old female who presents with:     Myopia, astigmatism, and presbyopia of both eyes      Normal eye exam.     Plan:  Glasses prescription given - optional to update    Chinyere Richter MD  [phone]

## 2019-07-26 NOTE — LETTER
7/26/2019         RE: Makayla Diaz  [address]  [place] [address]        Dear Colleague,    Thank you for referring your patient, Makayla Diaz, to the [facility]. Please see a copy of my visit note below.     Current Eye Medications:  None     Subjective: Referred by  Angelica Hills PA-C for eye exam due to loss of balance problems. PT notes that she feels like the room tilts and happens multiple times daily x 3 years.  PT notes she doesn't wear glasses full time and mostly wears them for reading and night driving.     FHx: glaucoma-mother. No previous eye injuries or surgeries.      Objective:  See Ophthalmology Exam.      Assessment:  Makayla Diaz is a 49 year old female who presents with:     Myopia, astigmatism, and presbyopia of both eyes      Normal eye exam.     Plan:  Glasses prescription given - optional to update    Chinyere Richter MD  [phone]               Again, thank you for allowing me to participate in the care of your patient.        Sincerely,        Chinyere Richter MD

## 2019-08-20 ENCOUNTER — OFFICE VISIT (OUTPATIENT)
Dept: SLEEP MEDICINE | Facility: CLINIC | Age: 49
End: 2019-08-20
Attending: PHYSICIAN ASSISTANT
Payer: COMMERCIAL

## 2019-08-20 VITALS
WEIGHT: 179 LBS | OXYGEN SATURATION: 97 % | HEART RATE: 85 BPM | HEIGHT: 65 IN | DIASTOLIC BLOOD PRESSURE: 91 MMHG | BODY MASS INDEX: 29.82 KG/M2 | SYSTOLIC BLOOD PRESSURE: 138 MMHG

## 2019-08-20 DIAGNOSIS — G47.00 INSOMNIA, UNSPECIFIED TYPE: Primary | ICD-10-CM

## 2019-08-20 DIAGNOSIS — F51.04 CHRONIC INSOMNIA: ICD-10-CM

## 2019-08-20 PROBLEM — I10 ESSENTIAL HYPERTENSION WITH GOAL BLOOD PRESSURE LESS THAN 130/80: Chronic | Status: ACTIVE | Noted: 2017-10-16

## 2019-08-20 PROCEDURE — 99244 OFF/OP CNSLTJ NEW/EST MOD 40: CPT | Performed by: PHYSICIAN ASSISTANT

## 2019-08-20 ASSESSMENT — MIFFLIN-ST. JEOR: SCORE: 1429.88

## 2019-08-20 NOTE — PATIENT INSTRUCTIONS
Your BMI is Body mass index is 30.25 kg/m .  Weight management is a personal decision.  If you are interested in exploring weight loss strategies, the following discussion covers the approaches that may be successful. Body mass index (BMI) is one way to tell whether you are at a healthy weight, overweight, or obese. It measures your weight in relation to your height.  A BMI of 18.5 to 24.9 is in the healthy range. A person with a BMI of 25 to 29.9 is considered overweight, and someone with a BMI of 30 or greater is considered obese. More than two-thirds of American adults are considered overweight or obese.  Being overweight or obese increases the risk for further weight gain. Excess weight may lead to heart disease and diabetes.  Creating and following plans for healthy eating and physical activity may help you improve your health.  Weight control is part of healthy lifestyle and includes exercise, emotional health, and healthy eating habits. Careful eating habits lifelong are the mainstay of weight control. Though there are significant health benefits from weight loss, long-term weight loss with diet alone may be very difficult to achieve- studies show long-term success with dietary management in less than 10% of people. Attaining a healthy weight may be especially difficult to achieve in those with severe obesity. In some cases, medications, devices and surgical management might be considered.  What can you do?  If you are overweight or obese and are interested in methods for weight loss, you should discuss this with your provider.     Consider reducing daily calorie intake by 500 calories.     Keep a food journal.     Avoiding skipping meals, consider cutting portions instead.    Diet combined with exercise helps maintain muscle while optimizing fat loss. Strength training is particularly important for building and maintaining muscle mass. Exercise helps reduce stress, increase energy, and improves fitness.  Increasing exercise without diet control, however, may not burn enough calories to loose weight.       Start walking three days a week 10-20 minutes at a time    Work towards walking thirty minutes five days a week     Eventually, increase the speed of your walking for 1-2 minutes at time    In addition, we recommend that you review healthy lifestyles and methods for weight loss available through the National Institutes of Health patient information sites:  http://win.niddk.nih.gov/publications/index.htm    And look into health and wellness programs that may be available through your health insurance provider, employer, local community center, or ja club.    Weight management plan: Patient was referred to their PCP to discuss a diet and exercise plan.

## 2019-08-20 NOTE — NURSING NOTE
"Chief Complaint   Patient presents with     Sleep Problem     consult- cant sleep without sleep aids       Initial BP (!) 138/91   Pulse 85   Ht 1.638 m (5' 4.5\")   Wt 81.2 kg (179 lb)   LMP 02/28/2014   SpO2 97%   BMI 30.25 kg/m   Estimated body mass index is 30.25 kg/m  as calculated from the following:    Height as of this encounter: 1.638 m (5' 4.5\").    Weight as of this encounter: 81.2 kg (179 lb).    Medication Reconciliation: complete    Neck circumference: 15 inches / 38 centimeters.      "

## 2019-08-20 NOTE — PROGRESS NOTES
Sleep Consultation:    Date on this visit: 8/20/2019    Makayla Diaz is sent by Angelica Hills for a sleep consultation regarding insomnia.    Primary Physician: Angelica Hills     Chief Complaint   Patient presents with     Sleep Problem     consult- cant sleep without sleep aids     Makayla Diza is a 49 year old female who presents in clinic today with 18 year history of difficulty falling asleep. She does not remember antecedent illness or trigger.     Makayla works 3/7 days. On working days, she goes to bed at 7:30 PM to relax and watch televison. She falls asleep at 9:00 PM with help of Advil PM or Tylenol PM. She wakes up at 3:00 AM with an alarm. On non work days, she does not take sleep aids and goes to bed at 10 AM. Sleep latency is about 5 hours. She wakes up at 6:00 AM without an alarm.  Patient gets an average of 5 hours of sleep per night.     Patient does watch TV in bed and does not use electronics in bed and read in bed.     Makayla does not do shift work.     Makayla does not snore. Patient does have a regular bed partner. There is not report of snoring, kicking, punching and gasping.  She does not have witnessed apneas. They never sleep separately.  Patient sleeps on her back and side. She denies no morning headaches, morning confusion and restless legs. Makayla denies any bruxism, sleep walking, sleep talking, dream enactment, sleep paralysis, cataplexy and hypnogogic/hypnopompic hallucinations.    Makayla denies difficulty breathing through her nose, claustrophobia and reflux at night.      Makayla has gained 0-5 pounds in the last year.  Patient describes themself as neither a morning or night person.  She would prefer to go to sleep at 7:00 PM and wake up at 8:00 AM.  Patient's [place] Sleepiness score 8/24 inconsistent with excessive  daytime sleepiness.  She has some fatigue.    Makayla does not nap. She takes some inadvertant naps.  She denies falling asleep  while driving.   Patient was counseled on the importance of driving while alert, to pull over if drowsy, or nap before getting into the vehicle if sleepy.  She uses no caffeine.     Allergies:    Allergies   Allergen Reactions     Strawberry Anaphylaxis     Lisinopril      cough       Medications:    Current Outpatient Medications   Medication Sig Dispense Refill     diphenhydrAMINE-acetaminophen (TYLENOL PM)  MG tablet Take 0.5 tablets by mouth nightly as needed for sleep       losartan (COZAAR) 25 MG tablet Take 1 tablet (25 mg) by mouth daily 90 tablet 1     metoprolol succinate ER (TOPROL-XL) 25 MG 24 hr tablet Take 1 tablet (25 mg) by mouth daily 90 tablet 1     docusate sodium (COLACE) 100 MG tablet Take 100 mg by mouth daily (Patient not taking: Reported on 7/16/2019) 60 tablet 1     triamcinolone (KENALOG) 0.5 % external ointment Apply to arm twice daily x 2 weeks if needed 15 g 1       Problem List:  Patient Active Problem List    Diagnosis Date Noted     Syrinx of spinal cord (H) 02/21/2018     Priority: Medium     Essential hypertension with goal blood pressure less than 130/80 10/16/2017     Priority: Medium     ACP (advance care planning) 02/08/2016     Priority: Medium     Advance Care Planning 2/8/2016: ACP Review of Chart / Resources Provided:  Reviewed chart for advance care plan.  Makayla Diaz has no plan or code status on file. Discussed available resources and provided with information. Confirmed code status reflects current choices pending further ACP discussions.  Confirmed/documented legally designated decision maker(s). Added by Tonya Jimenez             Mid back pain on left side 04/23/2014     Priority: Medium     Neck pain on left side 11/29/2013     Priority: Medium     Chronic nonintractable headache 04/30/2012     Priority: Medium     Problem list name updated by automated process. Provider to review       Neck pain 02/09/2012     Priority: Medium     Mastodynia  10/10/2011     Priority: Medium     Fibroadenoma of breast 10/10/2011     Priority: Medium     Obesity 05/24/2011     Priority: Medium     CARDIOVASCULAR SCREENING; LDL GOAL LESS THAN 160 10/31/2010     Priority: Medium     [place] 10-year CHD Risk Score: <1% (8 Total Points)   Values used to calculate score:     Age: 43 years -- Points: 0     Total Cholesterol: 201 mg/dL -- Points: 6     HDL Cholesterol: 52 mg/dL -- Points: 0     Systolic BP (untreated): 136 mmHg -- Points: 2     The patient is not a smoker. -- Points: 0     The patient has not been diagnosed with diabetes. -- Points: 0     The patient does not have a family history of CHD. -- Points:0             Past Medical/Surgical History:  Past Medical History:   Diagnosis Date     Abnormal uterine bleeding      Acute pain of right shoulder 7/11/2016     Dysmenorrhea      Dyspareunia      Fibroids      headache      Sinus tachycardia 10/26/2012     Past Surgical History:   Procedure Laterality Date     BIOPSY  2012    breast Bx left     BIOPSY  2011    right breast     COLONOSCOPY WITH CO2 INSUFFLATION N/A 8/5/2016    Procedure: COLONOSCOPY WITH CO2 INSUFFLATION;  Surgeon: Duane, William Charles, MD;  Location: [facility]     ESOPHAGOSCOPY, GASTROSCOPY, DUODENOSCOPY (EGD), COMBINED N/A 12/11/2014    Procedure: COMBINED ESOPHAGOSCOPY, GASTROSCOPY, DUODENOSCOPY (EGD);  Surgeon: Jimi Waterman MD;  Location: [facility]     ESOPHAGOSCOPY, GASTROSCOPY, DUODENOSCOPY (EGD), COMBINED N/A 12/11/2014    Procedure: COMBINED ESOPHAGOSCOPY, GASTROSCOPY, DUODENOSCOPY (EGD), BIOPSY SINGLE OR MULTIPLE;  Surgeon: Jimi Waterman MD;  Location: [facility]     HYSTERECTOMY TOTAL ABDOMINAL  3/25/14    [place]      HYSTERECTOMY, PAP NO LONGER INDICATED  2014     TONSILLECTOMY   age 2     TUBAL LIGATION  2001       Social History:  Social History     Socioeconomic History     Marital status:      Spouse name: Not on file     Number of children: 2     Years of  education: 15     Highest education level: Not on file   Occupational History     Occupation: Production Tech     Employer: [employer]   Social Needs     Financial resource strain: Not on file     Food insecurity:     Worry: Not on file     Inability: Not on file     Transportation needs:     Medical: Not on file     Non-medical: Not on file   Tobacco Use     Smoking status: Never Smoker     Smokeless tobacco: Never Used   Substance and Sexual Activity     Alcohol use: Yes     Comment: 0-2 glasses wine monthly     Drug use: No     Sexual activity: Yes     Partners: Male     Birth control/protection: Surgical   Lifestyle     Physical activity:     Days per week: Not on file     Minutes per session: Not on file     Stress: Not on file   Relationships     Social connections:     Talks on phone: Not on file     Gets together: Not on file     Attends Muslim service: Not on file     Active member of club or organization: Not on file     Attends meetings of clubs or organizations: Not on file     Relationship status: Not on file     Intimate partner violence:     Fear of current or ex partner: Not on file     Emotionally abused: Not on file     Physically abused: Not on file     Forced sexual activity: Not on file   Other Topics Concern     Parent/sibling w/ CABG, MI or angioplasty before 65F 55M? No      Service No     Blood Transfusions No     Caffeine Concern No     Occupational Exposure No     Hobby Hazards No     Sleep Concern Yes     Stress Concern No     Weight Concern Yes     Special Diet Yes     Back Care No     Exercise No     Bike Helmet No     Seat Belt Yes     Self-Exams Yes   Social History Narrative     Not on file       Family History:  Family History   Problem Relation Age of Onset     Diabetes Mother      Heart Disease Mother         age 48 had valve problems     Hypertension Mother      Neurologic Disorder Mother 18        migraine     Glaucoma Mother      Breast Cancer Maternal Grandmother   "    Thyroid Disease Sister      Hypertension Sister      Hypertension Brother        Review of Systems:  A complete review of systems reviewed by me is negative with the exeption of what has been mentioned in the history of present illness.  CONSTITUTIONAL: NEGATIVE for weight gain/loss, fever, chills, sweats or night sweats.  EYES: NEGATIVE for changes in vision, blind spots, double vision.  ENT: NEGATIVE for ear pain, sore throat, sinus pain, post-nasal drip, runny nose, bloody nose  CARDIAC: NEGATIVE for fast heartbeats or fluttering in chest, chest pain or pressure, breathlessness when lying flat, swollen legs or swollen feet.  NEUROLOGIC:  POSITIVE for  headaches and NEGATIVE for  weakness or numbness in the arms or legs  DERMATOLOGIC: NEGATIVE for rashes, new moles or change in mole(s)  PULMONARY: NEGATIVE SOB at rest, SOB with activity, dry cough, productive cough, coughing up blood, wheezing or whistling when breathing.    GASTROINTESTINAL: NEGATIVE for nausea or vomitting, loose or watery stools, fat or grease in stools, constipation, abdominal pain, bowel movements black in color or blood noted.  GENITOURINARY: NEGATIVE for pain during urination, blood in urine, urinating more frequently than usual, irregular menstrual periods.  MUSCULOSKELETAL: NEGATIVE for muscle pain, bone or joint pain, swollen joints.  ENDOCRINE: NEGATIVE for increased thirst or urination, diabetes.  LYMPHATIC: NEGATIVE for swollen lymph nodes, lumps or bumps in the breasts or nipple discharge.    Physical Examination:  Vitals: BP (!) 138/91   Pulse 85   Ht 1.638 m (5' 4.5\")   Wt 81.2 kg (179 lb)   LMP 02/28/2014   SpO2 97%   BMI 30.25 kg/m    BMI= Body mass index is 30.25 kg/m .    Neck Cir (cm): 38 cm    Call Total Score 8/20/2019   Total score - Call 8       CECILY Total Score: 16 (08/20/19 1300)    GENERAL APPEARANCE: alert and no distress  EYES: Eyes grossly normal to inspection, PERRL and conjunctivae and sclerae " normal  HENT: oropharynx crowded  NECK: no asymmetry, masses, or scars  RESP: lungs clear to auscultation - no rales, rhonchi or wheezes  CV: regular rates and rhythm and normal S1 S2, no S3 or S4  MS: extremities normal- no gross deformities noted  NEURO: Normal strength and tone, mentation intact and speech normal  PSYCH: mentation appears normal and affect normal/bright  Mallampati Class: III.  Tonsillar Stage:     Last Comprehensive Metabolic Panel:  Sodium   Date Value Ref Range Status   07/16/2019 136 133 - 144 mmol/L Final     Potassium   Date Value Ref Range Status   07/16/2019 4.1 3.4 - 5.3 mmol/L Final     Chloride   Date Value Ref Range Status   07/16/2019 105 94 - 109 mmol/L Final     Carbon Dioxide   Date Value Ref Range Status   07/16/2019 25 20 - 32 mmol/L Final     Anion Gap   Date Value Ref Range Status   07/16/2019 6 3 - 14 mmol/L Final     Glucose   Date Value Ref Range Status   07/16/2019 89 70 - 99 mg/dL Final     Comment:     Fasting specimen     Urea Nitrogen   Date Value Ref Range Status   07/16/2019 11 7 - 30 mg/dL Final     Creatinine   Date Value Ref Range Status   07/16/2019 0.67 0.52 - 1.04 mg/dL Final     GFR Estimate   Date Value Ref Range Status   07/16/2019 >90 >60 mL/min/[1.73_m2] Final     Comment:     Non  GFR Calc  Starting 12/18/2018, serum creatinine based estimated GFR (eGFR) will be   calculated using the Chronic Kidney Disease Epidemiology Collaboration   (CKD-EPI) equation.       Calcium   Date Value Ref Range Status   07/16/2019 8.6 8.5 - 10.1 mg/dL Final     TSH   Date Value Ref Range Status   02/06/2018 0.88 0.40 - 4.00 mU/L Final     Impression/Plan:    1. Chronic insomnia-  We reviewed the pros and cons of pharmacological versus cognitive behavioral treatment of chronic insomnia. Patient is interested in CBT-I and referral placed. She will complete a 2 week sleep diary prior to her first appointment.    2. Patient does not exhibit significant risk  factors (STOP-BANG score 2/8) regarding sleep disordered breathing and as such polysomnogram is not warranted at this time.     Literature provided regarding sleep hygiene and insomnia.      She will follow up with me as needed.     CC: Angelica Hills

## 2019-08-22 PROBLEM — F51.04 CHRONIC INSOMNIA: Status: ACTIVE | Noted: 2019-08-22

## 2019-09-10 ENCOUNTER — OFFICE VISIT (OUTPATIENT)
Dept: FAMILY MEDICINE | Facility: CLINIC | Age: 49
End: 2019-09-10
Payer: COMMERCIAL

## 2019-09-10 VITALS
DIASTOLIC BLOOD PRESSURE: 85 MMHG | TEMPERATURE: 98 F | OXYGEN SATURATION: 98 % | HEART RATE: 70 BPM | BODY MASS INDEX: 30.25 KG/M2 | SYSTOLIC BLOOD PRESSURE: 125 MMHG | WEIGHT: 179 LBS

## 2019-09-10 DIAGNOSIS — G47.00 INSOMNIA, UNSPECIFIED TYPE: Primary | ICD-10-CM

## 2019-09-10 LAB — MAGNESIUM SERPL-MCNC: 2.3 MG/DL (ref 1.6–2.3)

## 2019-09-10 PROCEDURE — 36415 COLL VENOUS BLD VENIPUNCTURE: CPT | Performed by: PHYSICIAN ASSISTANT

## 2019-09-10 PROCEDURE — 82306 VITAMIN D 25 HYDROXY: CPT | Performed by: PHYSICIAN ASSISTANT

## 2019-09-10 PROCEDURE — 83735 ASSAY OF MAGNESIUM: CPT | Performed by: PHYSICIAN ASSISTANT

## 2019-09-10 PROCEDURE — 99213 OFFICE O/P EST LOW 20 MIN: CPT | Performed by: PHYSICIAN ASSISTANT

## 2019-09-10 NOTE — PROGRESS NOTES
Subjective     Makayla Diaz is a 49 year old female who presents to clinic today for the following health issues:    HPI   Patient requests lab work today -check magnesium ,potassum ,Vit d level and kidney,liver function   Is going to see a sleep psychologist.   She wants these done to see if they are contributing to her fatigue and insomnia.      The 10-year ASCVD risk score (Brittany LOCKETT Jr., et al., 2013) is: 4%    Values used to calculate the score:      Age: 49 years      Sex: Female      Is Non- : Yes      Diabetic: No      Tobacco smoker: No      Systolic Blood Pressure: 125 mmHg      Is BP treated: Yes      HDL Cholesterol: 50 mg/dL      Total Cholesterol: 246 mg/dL        Patient Active Problem List   Diagnosis     CARDIOVASCULAR SCREENING; LDL GOAL LESS THAN 160     Obesity     Mastodynia     Fibroadenoma of breast     Neck pain     Chronic nonintractable headache     Neck pain on left side     Mid back pain on left side     ACP (advance care planning)     Essential hypertension with goal blood pressure less than 130/80     Syrinx of spinal cord (H)     Chronic insomnia     Past Surgical History:   Procedure Laterality Date     BIOPSY  2012    breast Bx left     BIOPSY  2011    right breast     COLONOSCOPY WITH CO2 INSUFFLATION N/A 8/5/2016    Procedure: COLONOSCOPY WITH CO2 INSUFFLATION;  Surgeon: Duane, William Charles, MD;  Location: [facility]     ESOPHAGOSCOPY, GASTROSCOPY, DUODENOSCOPY (EGD), COMBINED N/A 12/11/2014    Procedure: COMBINED ESOPHAGOSCOPY, GASTROSCOPY, DUODENOSCOPY (EGD);  Surgeon: Jimi Waterman MD;  Location: [facility]     ESOPHAGOSCOPY, GASTROSCOPY, DUODENOSCOPY (EGD), COMBINED N/A 12/11/2014    Procedure: COMBINED ESOPHAGOSCOPY, GASTROSCOPY, DUODENOSCOPY (EGD), BIOPSY SINGLE OR MULTIPLE;  Surgeon: Jimi Waterman MD;  Location: [facility]     HYSTERECTOMY TOTAL ABDOMINAL  3/25/14    [place]      HYSTERECTOMY, PAP NO LONGER INDICATED  2014      "TONSILLECTOMY   age 2     TUBAL LIGATION  2001       Social History     Tobacco Use     Smoking status: Never Smoker     Smokeless tobacco: Never Used   Substance Use Topics     Alcohol use: Yes     Comment: 0-2 glasses wine monthly     Family History   Problem Relation Age of Onset     Diabetes Mother      Heart Disease Mother         age 48 had valve problems     Hypertension Mother      Neurologic Disorder Mother 18        migraine     Glaucoma Mother      Breast Cancer Maternal Grandmother      Thyroid Disease Sister      Hypertension Sister      Hypertension Brother              Reviewed and updated as needed this visit by Provider  Allergies  Meds         Review of Systems   As above      Objective    /85   Pulse 70   Temp 98  F (36.7  C) (Oral)   Wt 81.2 kg (179 lb)   LMP 02/28/2014   SpO2 98%   BMI 30.25 kg/m    Body mass index is 30.25 kg/m .  Physical Exam   GENERAL: healthy, alert and no distress  PSYCH: mentation appears normal, affect normal/bright    Diagnostic Test Results:  Labs reviewed in Epic        Assessment & Plan     1. Insomnia, unspecified type  Follow up when labs are back.  In July CMP was normal.    - Magnesium  - Vitamin D Deficiency     BMI:   Estimated body mass index is 30.25 kg/m  as calculated from the following:    Height as of 8/20/19: 1.638 m (5' 4.5\").    Weight as of this encounter: 81.2 kg (179 lb).   Weight management plan: diet was discussed            No follow-ups on file.    Angelica Hills PA-C  [facility]      "

## 2019-09-11 LAB — DEPRECATED CALCIDIOL+CALCIFEROL SERPL-MC: 12 UG/L (ref 20–75)

## 2019-09-30 ENCOUNTER — HEALTH MAINTENANCE LETTER (OUTPATIENT)
Age: 49
End: 2019-09-30

## 2020-01-19 DIAGNOSIS — I10 ESSENTIAL HYPERTENSION WITH GOAL BLOOD PRESSURE LESS THAN 130/80: ICD-10-CM

## 2020-01-19 DIAGNOSIS — G89.29 CHRONIC NONINTRACTABLE HEADACHE, UNSPECIFIED HEADACHE TYPE: ICD-10-CM

## 2020-01-19 DIAGNOSIS — R51.9 CHRONIC NONINTRACTABLE HEADACHE, UNSPECIFIED HEADACHE TYPE: ICD-10-CM

## 2020-01-19 DIAGNOSIS — R00.0 TACHYCARDIA: ICD-10-CM

## 2020-01-20 RX ORDER — LOSARTAN POTASSIUM 25 MG/1
TABLET ORAL
Qty: 90 TABLET | Refills: 4 | Status: SHIPPED | OUTPATIENT
Start: 2020-01-20 | End: 2021-05-24

## 2020-01-20 RX ORDER — METOPROLOL SUCCINATE 25 MG/1
TABLET, EXTENDED RELEASE ORAL
Qty: 90 TABLET | Refills: 4 | Status: SHIPPED | OUTPATIENT
Start: 2020-01-20 | End: 2021-05-24

## 2020-01-20 NOTE — TELEPHONE ENCOUNTER
"Requested Prescriptions   Pending Prescriptions Disp Refills     metoprolol succinate ER (TOPROL-XL) 25 MG 24 hr tablet [Pharmacy Med Name: METOPROLOL SUCCINATE ER TABS 25MG] 90 tablet 4     Sig: TAKE 1 TABLET DAILY   Last Written Prescription Date:  7/16/19  Last Fill Quantity: 90,  # refills: 1   Last office visit: 9/10/2019 with prescribing provider:     Future Office Visit:        Beta-Blockers Protocol Passed - 1/19/2020 11:31 PM        Passed - Blood pressure under 140/90 in past 12 months     BP Readings from Last 3 Encounters:   09/10/19 125/85   08/20/19 (!) 138/91   07/16/19 112/76                 Passed - Patient is age 6 or older        Passed - Recent (12 mo) or future (30 days) visit within the authorizing provider's specialty     Patient has had an office visit with the authorizing provider or a provider within the authorizing providers department within the previous 12 mos or has a future within next 30 days. See \"Patient Info\" tab in inbasket, or \"Choose Columns\" in Meds & Orders section of the refill encounter.              Passed - Medication is active on med list        losartan (COZAAR) 25 MG tablet [Pharmacy Med Name: LOSARTAN TABS 25MG] 90 tablet 4     Sig: TAKE 1 TABLET DAILY   .Last Written Prescription Date:  7/16/19  Last Fill Quantity: 90,  # refills: 1   Last office visit: 9/10/2019 with prescribing provider:     Future Office Visit:        Angiotensin-II Receptors Passed - 1/19/2020 11:31 PM        Passed - Last blood pressure under 140/90 in past 12 months     BP Readings from Last 3 Encounters:   09/10/19 125/85   08/20/19 (!) 138/91   07/16/19 112/76                 Passed - Recent (12 mo) or future (30 days) visit within the authorizing provider's specialty     Patient has had an office visit with the authorizing provider or a provider within the authorizing providers department within the previous 12 mos or has a future within next 30 days. See \"Patient Info\" tab in inbasket, or " "\"Choose Columns\" in Meds & Orders section of the refill encounter.              Passed - Medication is active on med list        Passed - Patient is age 18 or older        Passed - No active pregnancy on record        Passed - Normal serum creatinine on file in past 12 months     Recent Labs   Lab Test 07/16/19  0946   CR 0.67             Passed - Normal serum potassium on file in past 12 months     Recent Labs   Lab Test 07/16/19  0946   POTASSIUM 4.1                    Passed - No positive pregnancy test in past 12 months          "

## 2020-01-20 NOTE — TELEPHONE ENCOUNTER
Prescription approved per [facility] Refill Protocol.  Cleopatra Acuna RN on 1/20/2020 at 3:53 PM

## 2020-03-22 ENCOUNTER — HEALTH MAINTENANCE LETTER (OUTPATIENT)
Age: 50
End: 2020-03-22

## 2020-05-02 ENCOUNTER — MYC MEDICAL ADVICE (OUTPATIENT)
Dept: FAMILY MEDICINE | Facility: CLINIC | Age: 50
End: 2020-05-02

## 2020-05-05 ENCOUNTER — OFFICE VISIT (OUTPATIENT)
Dept: FAMILY MEDICINE | Facility: CLINIC | Age: 50
End: 2020-05-05
Payer: COMMERCIAL

## 2020-05-05 VITALS
SYSTOLIC BLOOD PRESSURE: 151 MMHG | DIASTOLIC BLOOD PRESSURE: 96 MMHG | HEIGHT: 64 IN | WEIGHT: 178 LBS | HEART RATE: 73 BPM | BODY MASS INDEX: 30.39 KG/M2

## 2020-05-05 DIAGNOSIS — N64.4 BREAST PAIN: Primary | ICD-10-CM

## 2020-05-05 DIAGNOSIS — E55.9 VITAMIN D DEFICIENCY: ICD-10-CM

## 2020-05-05 PROCEDURE — 99213 OFFICE O/P EST LOW 20 MIN: CPT | Performed by: NURSE PRACTITIONER

## 2020-05-05 ASSESSMENT — MIFFLIN-ST. JEOR: SCORE: 1412.4

## 2020-05-05 NOTE — PROGRESS NOTES
Subjective     Makayla Diaz is a 50 year old female who presents to clinic today for the following health issues:    HPI   Concern - breast pain  Onset: 4 days    Description:   Edge of her breast by sternum. Pain wakes her up. Feels like a cramp when it happens and then it eases off.     Intensity: 7/10    Progression of Symptoms:  same    Previous history of similar problem:   Hx of lumpy breast, has had Biopsy done in the past.    Precipitating factors:   Worsened by: Bra    Therapies Tried and outcome: cold/hot pack didn't help.     She states that she has previously had markers placed in her breasts and was told this may cause pain. She states she recently started wearing a new underwire bra that she is wondering if it was pressing on one of these markers and causing pain. She states that pressing on specific areas makes the pain worse. She denies shortness of breath. The pain does not worsen with activity. She denies recent new activities that would contribute to her pain. She occasionally takes tylenol PM which promotes sleep. She had an EKG and echo in 2012 due to palpitations.     She is requesting to have her vitamin D level checked as it was low back in September. She is taking 5000iu once daily.    She adds that she also had intermittent joint pain, mostly when sitting or standing for too long and when she makes that initial move. She adds that with writing with her right hand she finds herself making a fist with her left hand which causes pain to her left hand. She states that this can be addressed at a later date.     Current Outpatient Medications   Medication Sig Dispense Refill     diphenhydrAMINE-acetaminophen (TYLENOL PM)  MG tablet Take 0.5 tablets by mouth nightly as needed for sleep       losartan (COZAAR) 25 MG tablet TAKE 1 TABLET DAILY 90 tablet 4     metoprolol succinate ER (TOPROL-XL) 25 MG 24 hr tablet TAKE 1 TABLET DAILY 90 tablet 4     Allergies   Allergen Reactions      "Strawberry Anaphylaxis     Lisinopril      cough       Reviewed and updated as needed this visit by Provider         Review of Systems   ROS COMP: Constitutional, HEENT, cardiovascular, pulmonary, gi and gu systems are negative, except breast pain      Objective    BP (!) 151/96   Pulse 73   Ht 1.626 m (5' 4\")   Wt 80.7 kg (178 lb)   LMP 02/28/2014   BMI 30.55 kg/m    Body mass index is 30.55 kg/m .  Physical Exam   GENERAL: healthy, alert and no distress  RESP: lungs clear to auscultation - no rales, rhonchi or wheezes  BREAST: tenderness to light palpation to right breast at 3 o'clock, no re-producible pain noted to left breast however pea sized lump noted at 9 o'clock   CV: regular rate and rhythm, normal S1 S2, no S3 or S4, no murmur, click or rub, no peripheral edema and peripheral pulses strong  MS: no gross musculoskeletal defects noted to chest wall, no edema  SKIN: no suspicious lesions or rashes  NEURO: Normal strength and tone, mentation intact and speech normal  PSYCH: mentation appears normal, affect normal/bright      Assessment & Plan     1. Breast pain  Does not appear to be cardiac in nature as symptoms do not worsen upon exertion and pain is made worse to palpation. Encouraged pt to alternate ice and heat for pain. We also discussed alternating tylenol and ibuprofen for pain. Will get ultrasound to further assess.   - US Breast Bilateral Limited 1-3 Quadrants; Future    2. Vitamin D deficiency  Pt plans to return for lab only visit to have vitamin D level checked.   - Vitamin D Deficiency       Return in about 1 week (around 5/12/2020) for If symptoms worsen or fail to improve.she plans to further discuss joint pain at another office visit     Lidya Schultz NP  [facility]    "

## 2020-05-06 DIAGNOSIS — E55.9 VITAMIN D DEFICIENCY: ICD-10-CM

## 2020-05-06 PROCEDURE — 36415 COLL VENOUS BLD VENIPUNCTURE: CPT | Performed by: NURSE PRACTITIONER

## 2020-05-06 PROCEDURE — 82306 VITAMIN D 25 HYDROXY: CPT | Performed by: NURSE PRACTITIONER

## 2020-05-07 LAB — DEPRECATED CALCIDIOL+CALCIFEROL SERPL-MC: 33 UG/L (ref 20–75)

## 2020-05-14 ENCOUNTER — ANCILLARY PROCEDURE (OUTPATIENT)
Dept: MAMMOGRAPHY | Facility: CLINIC | Age: 50
End: 2020-05-14
Attending: NURSE PRACTITIONER
Payer: COMMERCIAL

## 2020-05-14 ENCOUNTER — ANCILLARY PROCEDURE (OUTPATIENT)
Dept: ULTRASOUND IMAGING | Facility: CLINIC | Age: 50
End: 2020-05-14
Attending: NURSE PRACTITIONER
Payer: COMMERCIAL

## 2020-05-14 DIAGNOSIS — N64.4 BREAST PAIN: ICD-10-CM

## 2020-05-14 PROCEDURE — 76642 ULTRASOUND BREAST LIMITED: CPT | Mod: RT

## 2020-05-14 PROCEDURE — 77066 DX MAMMO INCL CAD BI: CPT

## 2020-05-14 PROCEDURE — G0279 TOMOSYNTHESIS, MAMMO: HCPCS

## 2020-05-19 ENCOUNTER — TELEPHONE (OUTPATIENT)
Dept: FAMILY MEDICINE | Facility: CLINIC | Age: 50
End: 2020-05-19

## 2020-05-19 NOTE — TELEPHONE ENCOUNTER
My chart message sent to pt to follow up to see if she is still having breast pain or if this has resolved

## 2021-01-15 ENCOUNTER — HEALTH MAINTENANCE LETTER (OUTPATIENT)
Age: 51
End: 2021-01-15

## 2021-05-03 ENCOUNTER — ANCILLARY PROCEDURE (OUTPATIENT)
Dept: MAMMOGRAPHY | Facility: CLINIC | Age: 51
End: 2021-05-03
Attending: PHYSICIAN ASSISTANT
Payer: COMMERCIAL

## 2021-05-03 DIAGNOSIS — Z12.31 VISIT FOR SCREENING MAMMOGRAM: ICD-10-CM

## 2021-05-03 PROCEDURE — 77067 SCR MAMMO BI INCL CAD: CPT | Mod: TC | Performed by: RADIOLOGY

## 2021-05-22 DIAGNOSIS — G89.29 CHRONIC NONINTRACTABLE HEADACHE, UNSPECIFIED HEADACHE TYPE: ICD-10-CM

## 2021-05-22 DIAGNOSIS — I10 ESSENTIAL HYPERTENSION WITH GOAL BLOOD PRESSURE LESS THAN 130/80: ICD-10-CM

## 2021-05-22 DIAGNOSIS — R51.9 CHRONIC NONINTRACTABLE HEADACHE, UNSPECIFIED HEADACHE TYPE: ICD-10-CM

## 2021-05-22 DIAGNOSIS — R00.0 TACHYCARDIA: ICD-10-CM

## 2021-05-22 NOTE — LETTER
May 24, 2021      Makayla Diaz  [address] [address]            Your provider has sent a 30 day alicia refill of losartan (COZAAR) 25 MG tablet and losartan (COZAAR) 25 MG tablet. You are due for an appointment for further refills. Please contact the clinic to schedule an appointment for further refills.      Sincerely,       [facility] / SHAMIR

## 2021-05-24 RX ORDER — METOPROLOL SUCCINATE 25 MG/1
TABLET, EXTENDED RELEASE ORAL
Qty: 90 TABLET | Refills: 0 | Status: SHIPPED | OUTPATIENT
Start: 2021-05-24 | End: 2021-09-10

## 2021-05-24 RX ORDER — LOSARTAN POTASSIUM 25 MG/1
TABLET ORAL
Qty: 90 TABLET | Refills: 0 | Status: SHIPPED | OUTPATIENT
Start: 2021-05-24 | End: 2021-09-10

## 2021-05-24 NOTE — TELEPHONE ENCOUNTER
Routing refill request to provider for review/approval because:  Labs not current: creatinine and potassium  BP  Appointment needed    Creatinine   Date Value Ref Range Status   07/16/2019 0.67 0.52 - 1.04 mg/dL Final     Potassium   Date Value Ref Range Status   07/16/2019 4.1 3.4 - 5.3 mmol/L Final     BP Readings from Last 3 Encounters:   05/05/20 (!) 151/96   09/10/19 125/85   08/20/19 (!) 138/91             Pending Prescriptions:                       Disp   Refills    losartan (COZAAR) 25 MG tablet [Pharmacy M*90 tab*3        Sig: TAKE 1 TABLET DAILY    metoprolol succinate ER (TOPROL-XL) 25 MG *90 tab*3        Sig: TAKE 1 TABLET DAILY        Amari Stewart RN

## 2021-09-25 ASSESSMENT — ENCOUNTER SYMPTOMS
JOINT SWELLING: 0
MYALGIAS: 0
HEADACHES: 1
HEMATOCHEZIA: 0
PALPITATIONS: 0
HEMATURIA: 0
FREQUENCY: 0
ARTHRALGIAS: 1
CONSTIPATION: 0
NERVOUS/ANXIOUS: 0
PARESTHESIAS: 0
CHILLS: 0
FEVER: 0
BREAST MASS: 0
SORE THROAT: 0
SHORTNESS OF BREATH: 0
DIARRHEA: 0
EYE PAIN: 0
ABDOMINAL PAIN: 0
WEAKNESS: 1
HEARTBURN: 0
DIZZINESS: 1
DYSURIA: 0
COUGH: 0
NAUSEA: 0

## 2021-09-29 ENCOUNTER — ANCILLARY PROCEDURE (OUTPATIENT)
Dept: GENERAL RADIOLOGY | Facility: CLINIC | Age: 51
End: 2021-09-29
Attending: PHYSICIAN ASSISTANT
Payer: COMMERCIAL

## 2021-09-29 ENCOUNTER — OFFICE VISIT (OUTPATIENT)
Dept: FAMILY MEDICINE | Facility: CLINIC | Age: 51
End: 2021-09-29
Payer: COMMERCIAL

## 2021-09-29 VITALS
BODY MASS INDEX: 31.82 KG/M2 | SYSTOLIC BLOOD PRESSURE: 135 MMHG | HEIGHT: 65 IN | TEMPERATURE: 99 F | WEIGHT: 191 LBS | HEART RATE: 71 BPM | DIASTOLIC BLOOD PRESSURE: 84 MMHG

## 2021-09-29 DIAGNOSIS — G89.29 CHRONIC PAIN OF LEFT KNEE: ICD-10-CM

## 2021-09-29 DIAGNOSIS — M25.551 HIP PAIN, RIGHT: ICD-10-CM

## 2021-09-29 DIAGNOSIS — M25.562 CHRONIC PAIN OF LEFT KNEE: ICD-10-CM

## 2021-09-29 DIAGNOSIS — F51.04 CHRONIC INSOMNIA: ICD-10-CM

## 2021-09-29 DIAGNOSIS — R10.9 STOMACH PAIN: ICD-10-CM

## 2021-09-29 DIAGNOSIS — I10 ESSENTIAL HYPERTENSION WITH GOAL BLOOD PRESSURE LESS THAN 130/80: Chronic | ICD-10-CM

## 2021-09-29 DIAGNOSIS — Z00.00 ROUTINE GENERAL MEDICAL EXAMINATION AT A HEALTH CARE FACILITY: Primary | ICD-10-CM

## 2021-09-29 PROCEDURE — 90471 IMMUNIZATION ADMIN: CPT | Performed by: PHYSICIAN ASSISTANT

## 2021-09-29 PROCEDURE — 90750 HZV VACC RECOMBINANT IM: CPT | Performed by: PHYSICIAN ASSISTANT

## 2021-09-29 PROCEDURE — 73560 X-RAY EXAM OF KNEE 1 OR 2: CPT | Mod: LT | Performed by: RADIOLOGY

## 2021-09-29 PROCEDURE — 73502 X-RAY EXAM HIP UNI 2-3 VIEWS: CPT | Mod: RT | Performed by: RADIOLOGY

## 2021-09-29 PROCEDURE — 99213 OFFICE O/P EST LOW 20 MIN: CPT | Mod: 25 | Performed by: PHYSICIAN ASSISTANT

## 2021-09-29 PROCEDURE — 99396 PREV VISIT EST AGE 40-64: CPT | Mod: 25 | Performed by: PHYSICIAN ASSISTANT

## 2021-09-29 ASSESSMENT — ENCOUNTER SYMPTOMS
FREQUENCY: 0
HEADACHES: 1
CONSTIPATION: 0
WEAKNESS: 1
PARESTHESIAS: 0
HEMATOCHEZIA: 0
ABDOMINAL PAIN: 1
DYSURIA: 0
PALPITATIONS: 0
FEVER: 0
HEMATURIA: 0
ROS SKIN COMMENTS: SWELLING IN NECK
SHORTNESS OF BREATH: 0
MYALGIAS: 0
ARTHRALGIAS: 1
COUGH: 0
JOINT SWELLING: 0
NAUSEA: 0
CHILLS: 0
DIZZINESS: 1
EYE PAIN: 0
DIARRHEA: 0
SORE THROAT: 0
NERVOUS/ANXIOUS: 0
BREAST MASS: 0
HEARTBURN: 0

## 2021-09-29 ASSESSMENT — MIFFLIN-ST. JEOR: SCORE: 1474.31

## 2021-09-29 NOTE — PROGRESS NOTES
SUBJECTIVE:   CC: Makayla Diaz is an 51 year old woman who presents for preventive health visit.       Patient has been advised of split billing requirements and indicates understanding: Yes  Healthy Habits:     Getting at least 3 servings of Calcium per day:  NO    Bi-annual eye exam:  Yes    Dental care twice a year:  Yes    Sleep apnea or symptoms of sleep apnea:  None    Diet:  Regular (no restrictions)    Frequency of exercise:  None    Taking medications regularly:  Yes    Barriers to taking medications:  None    Medication side effects:  Not applicable and None    PHQ-2 Total Score: 0    Additional concerns today:  No          Medication Followup of Tylenol PM -would like to discuss -taking for sleep     Taking Medication as prescribed: yes    Side Effects:  None    Medication Helping Symptoms:  Yes    Tried melatonin and didn't work    Only takes tylenol pm 3 days a week for when she works.      No side effects.            R hip - and knees pain on and off     Onset: 1 year     Description:   Location: R hip and knees   Character: Dull ache     Intensity: moderate    Progression of Symptoms: worse    Accompanying Signs & Symptoms:  Other symptoms: weakness    History:   Previous similar pain: no       Precipitating factors:   Trauma or overuse: no     Alleviating factors:  Improved by: nothing  Catches with change of position.  No injury.  No radiation.    Left knee also gives out sometimes.  Achy pain too.  Has been painful up stairs for years.    No injury but did gain 20lbs.      Therapies Tried and outcome: none       Has cramping stomach pain x years.  Worse if she touches it.  Nothing makes it better or worse.      Today's PHQ-2 Score:   PHQ-2 ( 1999 [employer]) 9/25/2021   Q1: Little interest or pleasure in doing things 0   Q2: Feeling down, depressed or hopeless 0   PHQ-2 Score 0   Q1: Little interest or pleasure in doing things Not at all   Q2: Feeling down, depressed or hopeless Not at all    PHQ-2 Score 0       Abuse: Current or Past (Physical, Sexual or Emotional) - No  Do you feel safe in your environment? Yes    Have you ever done Advance Care Planning? (For example, a Health Directive, POLST, or a discussion with a medical provider or your loved ones about your wishes): No, advance care planning information given to patient to review.  Patient plans to discuss their wishes with loved ones or provider.      Social History     Tobacco Use     Smoking status: Never Smoker     Smokeless tobacco: Never Used   Substance Use Topics     Alcohol use: Yes     Comment: 0-2 glasses wine monthly     If you drink alcohol do you typically have >3 drinks per day or >7 drinks per week? No    No flowsheet data found.    Reviewed orders with patient.  Reviewed health maintenance and updated orders accordingly - Yes  Labs reviewed in EPIC    Breast Cancer Screening:    FHS-7:   Breast CA Risk Assessment (FHS-7) 9/25/2021   Did any of your first-degree relatives have breast or ovarian cancer? No   Did any of your relatives have bilateral breast cancer? Unknown   Did any man in your family have breast cancer? No   Did any woman in your family have breast and ovarian cancer? Yes   Did any woman in your family have breast cancer before age 50 y? No   Do you have 2 or more relatives with breast and/or ovarian cancer? No   Do you have 2 or more relatives with breast and/or bowel cancer? No       Mammogram Screening: Recommended annual mammography  Pertinent mammograms are reviewed under the imaging tab.    History of abnormal Pap smear: NO - age 30-65 PAP every 5 years with negative HPV co-testing recommended  PAP / HPV 10/21/2013 2/9/2012 3/2/2010   PAP (Historical) NIL NIL NIL     Reviewed and updated as needed this visit by clinical staff  Tobacco  Allergies  Meds   Med Hx  Surg Hx  Fam Hx  Soc Hx        Reviewed and updated as needed this visit by Provider   Allergies  Meds                 Review of Systems  "  Constitutional: Negative for chills and fever.   HENT: Positive for ear pain (feels clogged ) and hearing loss. Negative for congestion and sore throat.    Eyes: Positive for visual disturbance (gets eye checks regularly ). Negative for pain.   Respiratory: Negative for cough and shortness of breath.    Cardiovascular: Negative for chest pain, palpitations and peripheral edema.   Gastrointestinal: Positive for abdominal pain (chronic pain ). Negative for constipation, diarrhea, heartburn, hematochezia and nausea.   Breasts:  Positive for tenderness. Negative for breast mass and discharge.   Genitourinary: Positive for pelvic pain. Negative for dysuria, frequency, genital sores, hematuria, urgency, vaginal bleeding and vaginal discharge.   Musculoskeletal: Positive for arthralgias. Negative for joint swelling and myalgias.   Skin: Negative for rash.        Swelling in neck    Neurological: Positive for dizziness, weakness and headaches (not her chronic headaches, rarely ). Negative for paresthesias.   Psychiatric/Behavioral: Negative for mood changes. The patient is not nervous/anxious.           OBJECTIVE:   /84   Pulse 71   Temp 99  F (37.2  C) (Temporal)   Ht 1.638 m (5' 4.5\")   Wt 86.6 kg (191 lb)   LMP 02/28/2014   BMI 32.28 kg/m    Physical Exam  Constitutional:       Appearance: She is well-developed. She is obese.   HENT:      Right Ear: Tympanic membrane, ear canal and external ear normal.      Left Ear: Tympanic membrane, ear canal and external ear normal.      Mouth/Throat:      Pharynx: No oropharyngeal exudate.   Eyes:      Pupils: Pupils are equal, round, and reactive to light.   Neck:      Thyroid: No thyromegaly.   Cardiovascular:      Rate and Rhythm: Normal rate and regular rhythm.      Heart sounds: Normal heart sounds.   Pulmonary:      Effort: Pulmonary effort is normal.      Breath sounds: Normal breath sounds.   Chest:      Breasts:         Right: No mass, nipple discharge or skin " "change.         Left: No mass, nipple discharge or skin change.   Abdominal:      General: Bowel sounds are normal.      Palpations: Abdomen is soft.      Tenderness: There is abdominal tenderness (central -mostly middle right ).   Musculoskeletal:      Lumbar back: Normal.      Right hip: Tenderness present. Normal range of motion (pain with internal rotation ).      Left hip: Normal.      Right knee: Normal.      Left knee: Normal.   Lymphadenopathy:      Cervical: No cervical adenopathy.   Skin:     General: Skin is warm and dry.      Findings: No rash.   Neurological:      Mental Status: She is alert and oriented to person, place, and time.   Psychiatric:         Behavior: Behavior normal.             Diagnostic Test Results:  Labs reviewed in Epic    ASSESSMENT/PLAN:   (Z00.00) Routine general medical examination at a health care facility  (primary encounter diagnosis)  Comment:   Plan: MA Screen Bilateral w/[place], Adult Gastro Ref -         Procedure Only        Follow up as needed    (F51.04) Chronic insomnia  Comment:   Plan: ok to use tylenol pm as she does     (I10) Essential hypertension with goal blood pressure less than 130/80  Comment: at goal  Plan: no changes    (M25.551) Hip pain, right  Comment:   Plan: XR Hip Right 2-3 Views        Follow up when xray is back     (M25.562,  G89.29) Chronic pain of left knee  Comment:   Plan: XR Knee Left 1/2 Views        As above.  Encouraged weight loss    (R10.9) Stomach pain  Comment:   Plan: omeprazole (PRILOSEC) 20 MG DR capsule        Trial of this for 2 weeks.        Patient has been advised of split billing requirements and indicates understanding: Yes  COUNSELING:  Reviewed preventive health counseling, as reflected in patient instructions       Regular exercise       Healthy diet/nutrition    Estimated body mass index is 32.28 kg/m  as calculated from the following:    Height as of this encounter: 1.638 m (5' 4.5\").    Weight as of this encounter: 86.6 kg " (191 lb).    Weight management plan: Discussed healthy diet and exercise guidelines    She reports that she has never smoked. She has never used smokeless tobacco.      Counseling Resources:  ATP IV Guidelines  Pooled Cohorts Equation Calculator  Breast Cancer Risk Calculator  BRCA-Related Cancer Risk Assessment: FHS-7 Tool  FRAX Risk Assessment  ICSI Preventive Guidelines  Dietary Guidelines for Americans, 2010  USDA's MyPlate  ASA Prophylaxis  Lung CA Screening    [facility]

## 2021-09-29 NOTE — NURSING NOTE
Prior to immunization administration, verified patients identity using patient s name and date of birth. Please see Immunization Activity for additional information.     Screening Questionnaire for Adult Immunization    Are you sick today?   No   Do you have allergies to medications, food, a vaccine component or latex?   No   Have you ever had a serious reaction after receiving a vaccination?   No   Do you have a long-term health problem with heart, lung, kidney, or metabolic disease (e.g., diabetes), asthma, a blood disorder, no spleen, complement component deficiency, a cochlear implant, or a spinal fluid leak?  Are you on long-term aspirin therapy?   No   Do you have cancer, leukemia, HIV/AIDS, or any other immune system problem?   No   Do you have a parent, brother, or sister with an immune system problem?   No   In the past 3 months, have you taken medications that affect  your immune system, such as prednisone, other steroids, or anticancer drugs; drugs for the treatment of rheumatoid arthritis, Crohn s disease, or psoriasis; or have you had radiation treatments?   No   Have you had a seizure, or a brain or other nervous system problem?   No   During the past year, have you received a transfusion of blood or blood    products, or been given immune (gamma) globulin or antiviral drug?   No   For women: Are you pregnant or is there a chance you could become       pregnant during the next month?   No   Have you received any vaccinations in the past 4 weeks?   No     Immunization questionnaire answers were all negative.        Per orders of Marley [place] , injection of shingrix  given by Joi Ivory CMA. Patient instructed to remain in clinic for 15 minutes afterwards, and to report any adverse reaction to me immediately.       Screening performed by Joi Ivory CMA on 9/29/2021 at 10:48 AM.

## 2021-09-29 NOTE — PATIENT INSTRUCTIONS
Try omeprazole twice daily for 2 weeks for the stomach   Colonoscopy and mammogram   Preventive Health Recommendations  Female Ages 50 - 64    Yearly exam: See your health care provider every year in order to  o Review health changes.   o Discuss preventive care.    o Review your medicines if your doctor has prescribed any.      Get a Pap test every three years (unless you have an abnormal result and your provider advises testing more often).    If you get Pap tests with HPV test, you only need to test every 5 years, unless you have an abnormal result.     You do not need a Pap test if your uterus was removed (hysterectomy) and you have not had cancer.    You should be tested each year for STDs (sexually transmitted diseases) if you're at risk.     Have a mammogram every 1 to 2 years.    Have a colonoscopy at age 50, or have a yearly FIT test (stool test). These exams screen for colon cancer.      Have a cholesterol test every 5 years, or more often if advised.    Have a diabetes test (fasting glucose) every three years. If you are at risk for diabetes, you should have this test more often.     If you are at risk for osteoporosis (brittle bone disease), think about having a bone density scan (DEXA).    Shots: Get a flu shot each year. Get a tetanus shot every 10 years.    Nutrition:     Eat at least 5 servings of fruits and vegetables each day.    Eat whole-grain bread, whole-wheat pasta and brown rice instead of white grains and rice.    Get adequate Calcium and Vitamin D.     Lifestyle    Exercise at least 150 minutes a week (30 minutes a day, 5 days a week). This will help you control your weight and prevent disease.    Limit alcohol to one drink per day.    No smoking.     Wear sunscreen to prevent skin cancer.     See your dentist every six months for an exam and cleaning.    See your eye doctor every 1 to 2 years.

## 2021-10-06 ENCOUNTER — OFFICE VISIT (OUTPATIENT)
Dept: ORTHOPEDICS | Facility: CLINIC | Age: 51
End: 2021-10-06
Attending: PHYSICIAN ASSISTANT
Payer: COMMERCIAL

## 2021-10-06 VITALS
HEIGHT: 65 IN | WEIGHT: 190 LBS | DIASTOLIC BLOOD PRESSURE: 84 MMHG | BODY MASS INDEX: 31.65 KG/M2 | SYSTOLIC BLOOD PRESSURE: 134 MMHG

## 2021-10-06 DIAGNOSIS — G89.29 CHRONIC PAIN OF LEFT KNEE: ICD-10-CM

## 2021-10-06 DIAGNOSIS — M25.562 CHRONIC PAIN OF LEFT KNEE: ICD-10-CM

## 2021-10-06 DIAGNOSIS — M25.551 LATERAL PAIN OF RIGHT HIP: ICD-10-CM

## 2021-10-06 PROCEDURE — 99243 OFF/OP CNSLTJ NEW/EST LOW 30: CPT | Performed by: PEDIATRICS

## 2021-10-06 ASSESSMENT — MIFFLIN-ST. JEOR: SCORE: 1469.77

## 2021-10-06 NOTE — LETTER
10/6/2021         RE: Makayla Diaz  [address] [place] [address]        Dear Colleague,    Thank you for referring your patient, Makayla Diaz, to the [facility]. Please see a copy of my visit note below.    ASSESSMENT & PLAN    Makayla was seen today for pain and pain.    Diagnoses and all orders for this visit:    Lateral pain of right hip  -     Orthopedic  Referral  -     [facility]; Future    Chronic pain of left knee  -     Orthopedic  Referral  -     [facility] Referral; Future      Primarily functional issues, though likely also some early knee degenerative change.  PT next.  She prefers not starting with injection.  Questions answered. Discussed signs and symptoms that may indicate more serious issues; the patient was instructed to seek appropriate care if noted. Makayla indicates understanding of these issues and agrees with the plan.        See Patient Instructions  Patient Instructions   Right lateral hip pain consistent with greater trochanteric pain syndrome; does not appear hip joint related.  Left anterior knee pain is more from the patellofemoral compartment, more functional; clinically, there may be some chondromalacia (early wearing change in the area underneath the kneecap).  Plan physical therapy for each of these issues.  Monitor course over 4-6 weeks with PT.  If not improving, we did briefly discussed considerations such as imaging or possibly injection therapy.  Contact clinic if not improving during that timeframe, sooner if any questions or concerns.    If you have any further questions for your physician or physician s care team you can call [phone] and use option 3 to leave a voice message. Calls received during business hours will be returned same day.          Jasson Lindsey DO  [facility]      CC: Angelica Roberts  "Reinier      -----  Chief Complaint   Patient presents with     Left Knee - Pain     Right Hip - Pain       SUBJECTIVE  Makayla Diaz is a/an 51 year old female who is seen in consultation at the request of  Angelica Hills PA-C for evaluation of left knee and right hip    Left knee feels unstable.  Pain mostly in the anterior aspect.  Pain has been present for the past year, but instability sensation for the past 3 months.  No previous treatments.   NO injuries    Right lateral hip pain.  Feels weakness in the lateral aspect of her leg.  No injury or treatment.  Worse with prolonged sitting and transitioning to standing, same with opposite.  Feels a catching in her hip.   Pain has been present for about 1 year as well     The patient is seen by themselves.  The patient is Right handed    Onset: 1 years(s) ago. Reports insidious onset without acute precipitating event.  Location of Pain: right hip and left knee   Worsened by: activity, transitioning from sitting to standing and standing to sitting   Better with: laying down   Treatments tried: no treatment tried to date  Associated symptoms: locking or catching and feeling of instability    Orthopedic/Surgical history: NO  Social History/Occupation: works for [employer] in [employer].  Requires a lot of standing and walking as well as sitting     No family history pertinent to patient's problem today.     **  RIGHT hip:  + right lateral hip pain currently.  Notes some focal lateral tenderness.  If sitting for period of time, notes \"numb ache\" that radiates from lateral hip into right thigh.  Feels a catching sensation with sit to stand and reverse. Catching is more anterior and deep, but pain is more lateral.  Pain improves with lying down; does not necessarily have more pain with lying on right side.    **  LEFT knee:  Left knee pain after trying to do a little running again.  Notes pain with knee motion, e.g., stairs.  Pain started ~1 year ago. ~6 " "months ago was doing some stairs, and felt like knee gave way.  Pain is more anterior knee when giving way.  No noted mechanical symptoms.        REVIEW OF SYSTEMS:  Review of Systems   All other systems reviewed and are negative.        OBJECTIVE:  /84   Ht 1.638 m (5' 4.5\")   Wt 86.2 kg (190 lb)   LMP 02/28/2014   BMI 32.11 kg/m     General: healthy, alert and in no distress  HEENT: no scleral icterus or conjunctival erythema  Skin: no suspicious lesions or rash. No jaundice.  CV: distal perfusion intact   Resp: normal respiratory effort without conversational dyspnea   Psych: normal mood and affect  Gait: pain with position change, ambulates independently  Neuro: Normal light sensory exam of  extremity         Right hip exam    ROM:     Mild pain laterally with flexion  Pain laterally with ER, min with IR    Strength:      Some pain laterally with abduction    Tender:      greater trochanter    Non Tender:      remainder of hip area    Sensation:      grossly intact in hip and thigh    Special Tests:      neg (-) FADIR       Log roll neg        Left Knee exam    Inspection:   no effusion   no ecchymosis    ROM:      Full active and passive ROM with flexion and extension    Patellar Motion: mild discomfort with patellar translation  Crepitus noted    Special Tests:      neg (-) Dhiraj       neg (-) Lachman       neg (-) anterior drawer       neg (-) posterior drawer       neg (-) varus       neg (-) valgus       no pain with forced extension    Evaluation of ipsilateral kinetic chain:   Pain present with mini squat, single leg squat        RADIOLOGY:  I independently visualized and reviewed these images with the patient    No acute bony abnormality. Joint spaces appear fairly well preserved.      XR LEFT KNEE ONE-TWO VIEWS   9/29/2021 11:13 AM      HISTORY: Chronic pain of left knee.       COMPARISON: None.                                                                      IMPRESSION: Normal joint " spacing and alignment. No evidence of  fracture or effusion.     MAISHA CROSS MD           RIGHT HIP TWO TO THREE VIEWS   9/29/2021 11:11 AM      HISTORY: Hip pain, right.     COMPARISON: Pelvis x-ray from 9/13/2017.                                                                      IMPRESSION: Hip joint spaces appear fairly well-preserved. No fracture  or AVN. No significant change.      MAISHA CROSS MD          Review of prior external note(s) from - referring  Review of the result(s) of each unique test - imaging  Independent interpretation of a test performed by another physician/other qualified health care professional (not separately reported) - imaging  30 minutes spent on the date of the encounter doing chart review, history and exam, documentation and further activities per the note             Again, thank you for allowing me to participate in the care of your patient.        Sincerely,        Jasson Lindsey, DO

## 2021-10-06 NOTE — PATIENT INSTRUCTIONS
Right lateral hip pain consistent with greater trochanteric pain syndrome; does not appear hip joint related.  Left anterior knee pain is more from the patellofemoral compartment, more functional; clinically, there may be some chondromalacia (early wearing change in the area underneath the kneecap).  Plan physical therapy for each of these issues.  Monitor course over 4-6 weeks with PT.  If not improving, we did briefly discussed considerations such as imaging or possibly injection therapy.  Contact clinic if not improving during that timeframe, sooner if any questions or concerns.    If you have any further questions for your physician or physician s care team you can call [phone] and use option 3 to leave a voice message. Calls received during business hours will be returned same day.

## 2021-10-06 NOTE — PROGRESS NOTES
ASSESSMENT & PLAN    Makayla was seen today for pain and pain.    Diagnoses and all orders for this visit:    Lateral pain of right hip  -     Orthopedic  Referral  -     [facility]; Future    Chronic pain of left knee  -     Orthopedic  Referral  -     [facility]; Future      Primarily functional issues, though likely also some early knee degenerative change.  PT next.  She prefers not starting with injection.  Questions answered. Discussed signs and symptoms that may indicate more serious issues; the patient was instructed to seek appropriate care if noted. Makayla indicates understanding of these issues and agrees with the plan.        See Patient Instructions  Patient Instructions   Right lateral hip pain consistent with greater trochanteric pain syndrome; does not appear hip joint related.  Left anterior knee pain is more from the patellofemoral compartment, more functional; clinically, there may be some chondromalacia (early wearing change in the area underneath the kneecap).  Plan physical therapy for each of these issues.  Monitor course over 4-6 weeks with PT.  If not improving, we did briefly discussed considerations such as imaging or possibly injection therapy.  Contact clinic if not improving during that timeframe, sooner if any questions or concerns.    If you have any further questions for your physician or physician s care team you can call [phone] and use option 3 to leave a voice message. Calls received during business hours will be returned same day.          Jasson Lindsey [facility]      CC: Angelica Hills      -----  Chief Complaint   Patient presents with     Left Knee - Pain     Right Hip - Pain       SUBJECTIVE  Makayla Diaz is a/an 51 year old female who is seen in consultation at the request of  Angelica Hills PA-C for evaluation of left knee and right hip    Left knee  "feels unstable.  Pain mostly in the anterior aspect.  Pain has been present for the past year, but instability sensation for the past 3 months.  No previous treatments.   NO injuries    Right lateral hip pain.  Feels weakness in the lateral aspect of her leg.  No injury or treatment.  Worse with prolonged sitting and transitioning to standing, same with opposite.  Feels a catching in her hip.   Pain has been present for about 1 year as well     The patient is seen by themselves.  The patient is Right handed    Onset: 1 years(s) ago. Reports insidious onset without acute precipitating event.  Location of Pain: right hip and left knee   Worsened by: activity, transitioning from sitting to standing and standing to sitting   Better with: laying down   Treatments tried: no treatment tried to date  Associated symptoms: locking or catching and feeling of instability    Orthopedic/Surgical history: NO  Social History/Occupation: works for [employer] in [employer].  Requires a lot of standing and walking as well as sitting     No family history pertinent to patient's problem today.     **  RIGHT hip:  + right lateral hip pain currently.  Notes some focal lateral tenderness.  If sitting for period of time, notes \"numb ache\" that radiates from lateral hip into right thigh.  Feels a catching sensation with sit to stand and reverse. Catching is more anterior and deep, but pain is more lateral.  Pain improves with lying down; does not necessarily have more pain with lying on right side.    **  LEFT knee:  Left knee pain after trying to do a little running again.  Notes pain with knee motion, e.g., stairs.  Pain started ~1 year ago. ~6 months ago was doing some stairs, and felt like knee gave way.  Pain is more anterior knee when giving way.  No noted mechanical symptoms.        REVIEW OF SYSTEMS:  Review of Systems   All other systems reviewed and are negative.        OBJECTIVE:  /84   Ht 1.638 m (5' 4.5\")   Wt 86.2 kg " (190 lb)   LMP 02/28/2014   BMI 32.11 kg/m     General: healthy, alert and in no distress  HEENT: no scleral icterus or conjunctival erythema  Skin: no suspicious lesions or rash. No jaundice.  CV: distal perfusion intact   Resp: normal respiratory effort without conversational dyspnea   Psych: normal mood and affect  Gait: pain with position change, ambulates independently  Neuro: Normal light sensory exam of  extremity         Right hip exam    ROM:     Mild pain laterally with flexion  Pain laterally with ER, min with IR    Strength:      Some pain laterally with abduction    Tender:      greater trochanter    Non Tender:      remainder of hip area    Sensation:      grossly intact in hip and thigh    Special Tests:      neg (-) FADIR       Log roll neg        Left Knee exam    Inspection:   no effusion   no ecchymosis    ROM:      Full active and passive ROM with flexion and extension    Patellar Motion: mild discomfort with patellar translation  Crepitus noted    Special Tests:      neg (-) Dhiraj       neg (-) Lachman       neg (-) anterior drawer       neg (-) posterior drawer       neg (-) varus       neg (-) valgus       no pain with forced extension    Evaluation of ipsilateral kinetic chain:   Pain present with mini squat, single leg squat        RADIOLOGY:  I independently visualized and reviewed these images with the patient    No acute bony abnormality. Joint spaces appear fairly well preserved.      XR LEFT KNEE ONE-TWO VIEWS   9/29/2021 11:13 AM      HISTORY: Chronic pain of left knee.       COMPARISON: None.                                                                      IMPRESSION: Normal joint spacing and alignment. No evidence of  fracture or effusion.     MAISHA CROSS MD           RIGHT HIP TWO TO THREE VIEWS   9/29/2021 11:11 AM      HISTORY: Hip pain, right.     COMPARISON: Pelvis x-ray from 9/13/2017.                                                                      IMPRESSION:  Hip joint spaces appear fairly well-preserved. No fracture  or AVN. No significant change.      MAISHA CROSS MD          Review of prior external note(s) from - referring  Review of the result(s) of each unique test - imaging  Independent interpretation of a test performed by another physician/other qualified health care professional (not separately reported) - imaging  30 minutes spent on the date of the encounter doing chart review, history and exam, documentation and further activities per the note

## 2021-10-24 ENCOUNTER — HEALTH MAINTENANCE LETTER (OUTPATIENT)
Age: 51
End: 2021-10-24

## 2021-10-26 ENCOUNTER — TELEPHONE (OUTPATIENT)
Dept: FAMILY MEDICINE | Facility: CLINIC | Age: 51
End: 2021-10-26

## 2021-10-26 ENCOUNTER — NURSE TRIAGE (OUTPATIENT)
Dept: FAMILY MEDICINE | Facility: CLINIC | Age: 51
End: 2021-10-26

## 2021-10-26 DIAGNOSIS — N64.4 BREAST PAIN: Primary | ICD-10-CM

## 2021-10-26 NOTE — TELEPHONE ENCOUNTER
Ok to continue with mammogram-it probably will be more uncomfortable but will not affect test.  Does not need to be seen first.      Angelica Hills PA-C

## 2021-10-26 NOTE — TELEPHONE ENCOUNTER
Reason for call:  Other   Patient called regarding (reason for call): call back    Additional comments: per patient , she will like a call back. She was playing with grandchild and was accidentally hit in the breast. She is now experiencing pain . She wanted to see if she can have the 3D mammogram first or should she be worked into the schedule to see provider.    Phone number to reach patient:  Home number on file [phone] (home)    Best Time:  Anytime     Can we leave a detailed message on this number?  NO    Travel screening: Not Applicable

## 2021-10-26 NOTE — TELEPHONE ENCOUNTER
S-(situation): has order for tsering breast screening mammo w/[facility]. Has new left breast/chest pain due to injury. Patient wondering if she should go ahead with the mammogram or be seen for further eval first.     B-(background): grandchild accidentally hit her left breast/chest area 3 days ago. Has been having pain since. Pain is in breast tissue but is sometimes in the chest area with certain movement.    A-(assessment): breast/chest injury- pain rated 9/10 with certain movement, described as a sharp, stabbing pain. Not getting better or worse. Denies any breathing problems, numbness/tingling, changes in vision, etc.     R-(recommendations): to be seen for further eval first.     Routing to provider to advise- should patient be seen for further eval before proceeding with screening mammogram? No in clinic openings this week. Can this be a virtual visit with any provider? Can it wait til next week for a virtual with PCP?    Protocol disposition- to be seen in 3 days in clinic    Additional Information    Negative: Can't take a deep breath but no respiratory distress (e.g., hurts to take a deep breath)    Negative: Shallow puncture wound    Injury is still painful or swollen after 2 weeks    Negative: Major injury from dangerous force or speed (e.g., MVA, fall > 10 feet or 3 meters)    Negative: Bullet wound, knife wound or other penetrating object    Negative: Puncture wound that sounds life-threatening to the triager    Negative: Severe difficulty breathing (e.g., struggling for each breath, speaks in single words)    Negative: Major bleeding (actively dripping or spurting) that can't be stopped    Negative: Open wound of the chest with sound of moving air (sucking wound) or visible air bubbles    Negative: Shock suspected (e.g., cold/pale/clammy skin, too weak to stand, low BP, rapid pulse)    Negative: Coughing or spitting up blood    Negative: Bluish (or gray) lips or face    Negative: Unconscious or was  "unconscious    Negative: Sounds like a life-threatening emergency to the triager    Negative: Chest pain not from an injury    Negative: Wound looks infected    Negative: SEVERE chest pain    Negative: Difficulty breathing and not severe    Negative: Skin is split open or gaping (or length > 1/2 inch or 12 mm)    Negative: Bleeding won't stop after 10 minutes of direct pressure (using correct technique)    Negative: Sounds like a serious injury to the triager    Negative: Collarbone is painful and difficulty raising arm    Negative: Patient is confused or is an unreliable provider of information (e.g., dementia, profound mental retardation, alcohol intoxication)    Negative: No prior tetanus shots (or is not fully vaccinated) and any wound (e.g., cut or scrape)    Negative: Last tetanus shot > 5 years ago and DIRTY cut or scrape    Negative: Last tetanus shot >10 years ago and CLEAN cut or scrape    Negative: High-risk adult (e.g., age > 60, osteoporosis, chronic steroid use)    Negative: Suspicious history for the injury    Answer Assessment - Initial Assessment Questions  1. MECHANISM: \"How did the injury happen?\"      Was playing with her grandchild and child accidentally hit her in the left breast/chest  2. ONSET: \"When did the injury happen?\" (Minutes or hours ago)      3 days ago  3. LOCATION: \"Where on the chest is the injury located?\"      Left breast/chest  4. APPEARANCE: \"What does the injury look like?\"      \"I noticed a new vein in that area. I don't know if it was always there\" otherwise, everything looks as usual.  5. BLEEDING: \"Is there any bleeding now? If so, ask: How long has it been bleeding?\"      No bleeding   6. SEVERITY: \"Any difficulty with breathing?\"      No   7. SIZE: For cuts, bruises, or swelling, ask: \"How large is it?\" (e.g., inches or centimeters)      Denies   8. PAIN: \"Is there pain?\" If so, ask: \"How bad is the pain?\"   (e.g., Scale 1-10; or mild, moderate, severe)      Sharp " "stabbing pain, 9/10 on pain scale  9. TETANUS: For any breaks in the skin, ask: \"When was the last tetanus booster?\"        10. PREGNANCY: \"Is there any chance you are pregnant?\" \"When was your last menstrual period?\"        No, LMP- hysterectomy    Protocols used: CHEST INJURY-A-OH    Ammy Omalley RN  [facility]    "

## 2021-10-26 NOTE — TELEPHONE ENCOUNTER
RN contacted patient and gave message as written from Angelica Hills PA-C. Patient verbalized understanding and said she would call them to get it scheduled.  Cristina Petty RN on 10/26/2021 at 3:27 PM

## 2021-10-26 NOTE — TELEPHONE ENCOUNTER
Reason for Call: Request for an order or referral:    Order or referral being requested: Patient was told she needs an order for a diagnostic mammogram instead of the screening because there was an injury.    Date needed: as soon as possible    Has the patient been seen by the PCP for this problem? YES    Additional comments: I saw the order for the screening but she said she was told she needed the order for the diagnostic imaging.     Phone number Patient can be reached at:  Home number on file [phone] (home)    Best Time:  any    Can we leave a detailed message on this number?  YES    Call taken on 10/26/2021 at 3:46 PM by Elda Daniels

## 2021-10-27 RX ORDER — PREDNISONE 20 MG/1
20 TABLET ORAL DAILY
Qty: 5 TABLET | Refills: 0 | Status: SHIPPED | OUTPATIENT
Start: 2021-10-27 | End: 2021-11-08

## 2021-10-27 NOTE — TELEPHONE ENCOUNTER
I don't think she needs a diagnostic mammogram so lets just hold off until the pain is better. I agree with the over the counter treatment.  I will also send prednisone she can take if the alternating ibuprofen and tylenol doesn't help.  If not better in a week we should see her.    Angelica Hills PA-C

## 2021-10-27 NOTE — TELEPHONE ENCOUNTER
Spoke with pt. States she tried to schedule a screening mammogram and wasn't able to because she is having breast symptoms. States per radiology, she is needing a diagnostic mammogram.    Is having pain and wanted to know what to take for pain. When moving is worse. Laying in bed is ok. Rates pain 9/10 when she has it. Tylenol hasn't helped.   Advised to try Ibuprofen 400mg every 6 hours or 600mg every 8 hours. Can try heat 20 minutes as needed. Routing to provider to place order for diagnostic mammogram.    Renee Cates RN  [facility]

## 2021-10-27 NOTE — TELEPHONE ENCOUNTER
Patient notified of Provider's message as written.  Patient verbalized understanding.    Ammy Omalley RN  [facility]

## 2021-11-03 ENCOUNTER — THERAPY VISIT (OUTPATIENT)
Dept: PHYSICAL THERAPY | Facility: CLINIC | Age: 51
End: 2021-11-03
Attending: PEDIATRICS
Payer: COMMERCIAL

## 2021-11-03 DIAGNOSIS — M25.562 CHRONIC PAIN OF LEFT KNEE: ICD-10-CM

## 2021-11-03 DIAGNOSIS — G89.29 CHRONIC PAIN OF LEFT KNEE: ICD-10-CM

## 2021-11-03 DIAGNOSIS — M25.551 LATERAL PAIN OF RIGHT HIP: ICD-10-CM

## 2021-11-03 PROCEDURE — 97161 PT EVAL LOW COMPLEX 20 MIN: CPT | Mod: GP | Performed by: PHYSICAL THERAPIST

## 2021-11-03 PROCEDURE — 97110 THERAPEUTIC EXERCISES: CPT | Mod: GP | Performed by: PHYSICAL THERAPIST

## 2021-11-03 PROCEDURE — 97140 MANUAL THERAPY 1/> REGIONS: CPT | Mod: GP | Performed by: PHYSICAL THERAPIST

## 2021-11-03 ASSESSMENT — ACTIVITIES OF DAILY LIVING (ADL)
HOS_ADL_SCORE(%): 68.75
GETTING_INTO_AND_OUT_OF_A_BATHTUB: NO DIFFICULTY AT ALL
ROLLING_OVER_IN_BED: SLIGHT DIFFICULTY
GIVING WAY, BUCKLING OR SHIFTING OF KNEE: THE SYMPTOM AFFECTS MY ACTIVITY MODERATELY
HOS_ADL_ITEM_SCORE_TOTAL: 44
GO DOWN STAIRS: ACTIVITY IS SOMEWHAT DIFFICULT
LIGHT_TO_MODERATE_WORK: MODERATE DIFFICULTY
PUTTING_ON_SOCKS_AND_SHOES: NO DIFFICULTY AT ALL
WALKING_DOWN_STEEP_HILLS: NO DIFFICULTY AT ALL
WALKING_UP_STEEP_HILLS: NO DIFFICULTY AT ALL
GOING_UP_1_FLIGHT_OF_STAIRS: MODERATE DIFFICULTY
STEPPING_UP_AND_DOWN_CURBS: NO DIFFICULTY AT ALL
WEAKNESS: THE SYMPTOM AFFECTS MY ACTIVITY MODERATELY
GO UP STAIRS: ACTIVITY IS VERY DIFFICULT
SQUAT: ACTIVITY IS MINIMALLY DIFFICULT
SIT WITH YOUR KNEE BENT: ACTIVITY IS MINIMALLY DIFFICULT
TWISTING/PIVOTING_ON_INVOLVED_LEG: SLIGHT DIFFICULTY
HOW_WOULD_YOU_RATE_YOUR_CURRENT_LEVEL_OF_FUNCTION_DURING_YOUR_USUAL_ACTIVITIES_OF_DAILY_LIVING_FROM_0_TO_100_WITH_100_BEING_YOUR_LEVEL_OF_FUNCTION_PRIOR_TO_YOUR_HIP_PROBLEM_AND_0_BEING_THE_INABILITY_TO_PERFORM_ANY_OF_YOUR_USUAL_DAILY_ACTIVITIES?: 2
STAND: ACTIVITY IS NOT DIFFICULT
RAW_SCORE: 48
HOS_ADL_COUNT: 16
WALKING_15_MINUTES_OR_GREATER: MODERATE DIFFICULTY
STANDING_FOR_15_MINUTES: MODERATE DIFFICULTY
STIFFNESS: I DO NOT HAVE THE SYMPTOM
WALK: ACTIVITY IS NOT DIFFICULT
SITTING_FOR_15_MINUTES: MODERATE DIFFICULTY
GETTING_INTO_AND_OUT_OF_AN_AVERAGE_CAR: MODERATE DIFFICULTY
WALKING_APPROXIMATELY_10_MINUTES: MODERATE DIFFICULTY
HOS_ADL_HIGHEST_POTENTIAL_SCORE: 64
HOW_WOULD_YOU_RATE_THE_CURRENT_FUNCTION_OF_YOUR_KNEE_DURING_YOUR_USUAL_DAILY_ACTIVITIES_ON_A_SCALE_FROM_0_TO_100_WITH_100_BEING_YOUR_LEVEL_OF_KNEE_FUNCTION_PRIOR_TO_YOUR_INJURY_AND_0_BEING_THE_INABILITY_TO_PERFORM_ANY_OF_YOUR_USUAL_DAILY_ACTIVITIES?: 1
DEEP_SQUATTING: NO DIFFICULTY AT ALL
RECREATIONAL_ACTIVITIES: MODERATE DIFFICULTY
GOING_DOWN_1_FLIGHT_OF_STAIRS: MODERATE DIFFICULTY
HOW_WOULD_YOU_RATE_THE_OVERALL_FUNCTION_OF_YOUR_KNEE_DURING_YOUR_USUAL_DAILY_ACTIVITIES?: NORMAL
WALKING_INITIALLY: MODERATE DIFFICULTY
KNEE_ACTIVITY_OF_DAILY_LIVING_SUM: 48
PAIN: THE SYMPTOM AFFECTS MY ACTIVITY SLIGHTLY
RISE FROM A CHAIR: ACTIVITY IS SOMEWHAT DIFFICULT
KNEE_ACTIVITY_OF_DAILY_LIVING_SCORE: 68.57
KNEEL ON THE FRONT OF YOUR KNEE: ACTIVITY IS VERY DIFFICULT
SWELLING: I DO NOT HAVE THE SYMPTOM
AS_A_RESULT_OF_YOUR_KNEE_INJURY,_HOW_WOULD_YOU_RATE_YOUR_CURRENT_LEVEL_OF_DAILY_ACTIVITY?: NORMAL
LIMPING: I DO NOT HAVE THE SYMPTOM

## 2021-11-03 NOTE — PROGRESS NOTES
Physical Therapy Initial Evaluation  Subjective:  The history is provided by the patient.   Patient Health History  Makayla Diaz being seen for Rt hip, left knee pain .     Problem began: 10/1/2020.   Problem occurred: unknown    Pain is reported as 3/10 on pain scale.  General health as reported by patient is good.  Pertinent medical history includes: migraines/headaches, high blood pressure and overweight.   Red flags:  None as reported by patient.         Current medications:  High blood pressure medication and pain medication.       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                  Therapist Generated HPI Evaluation  Problem details: Pt describes a new recurring pain in ant left knee w/ stairs and stnding. .         Type of problem:  Left knee.    This is a new condition.  Condition occurred with:  Repetition/overuse.  Where condition occurred: at home.  Patient reports pain:  Anterior.  and is intermittent.  Pain is worse in the P.M..    Associated symptoms:  Buckling/giving out. Symptoms are exacerbated by nothing  and relieved by nothing.      Restrictions due to condition include:  Working in normal job without restrictions.  Barriers include:  None as reported by patient.    Therapist Generated HPI Evaluation  Problem details: Pt reports a catching, chronic pain in her Rt ant hip, lat hip w/ shooting pain into her thigh for about a year. She does not recall any event, was a runner in  but has not been involved in sports for years. The pain is worse in stnding, stairs and squatting. .         Type of problem:  Right hip.    This is a chronic condition.  Condition occurred with:  Insidious onset.  Where condition occurred: at home.  Patient reports pain:  Anterior, groin, IT band and lateral.  Pain is described as aching, sharp and stabbing   Pain radiates to:  Thigh. Pain is worse in the P.M..  Since onset symptoms are gradually worsening.  Associated  symptoms:  Loss of motion/stiffness and buckling/giving out. Symptoms are exacerbated by bending/squatting, descending stairs, ascending stairs, standing and walking  and relieved by nothing.      Restrictions due to condition include:  Working in normal job without restrictions.  Barriers include:  None as reported by patient.                        Objective:  System         Lumbar/SI Evaluation  ROM:  AROM Lumbar: normal                        SI joint/Sacrum:          Left negative at:    Ilium Ventromedial  Right positive at:    Ilium Ventromedial    Sacral conclusion right:  Outflare, posterior inominate, locked, upslip and elevated pubic sym                                  Hip Evaluation      Hip Special Testing:       Right hip positive for the following special tests:  Piriformis; Fadir/Labrum and Alfredito'sRight hip negative for the following special tests:  Margarita or SLR    Hip Palpation:      Right hip tenderness present at:  Greater Trachanter; IT Band; hip flexors; Piriformis; PSIS; Adductors; Abductors; Iliac Crest; Gluteus Medius; Bursa and Pubis       Knee Evaluation:    Ligament Testing:  Normal                Special Tests: Normal      Palpation:    Left knee tenderness present at:  Patellar Inferior (inf angle +++in prox shift )    Edema:  Normal    Mobility Testing:  Mobility testing: hypo patellar PROM                   General     ROS    Assessment/Plan:    Patient is a 51 year old female with right side hip and left side knee complaints.    Patient has the following significant findings with corresponding treatment plan.                Diagnosis 1:  Rt hip pain   Pain -  hot/cold therapy, manual therapy, self management and home program  Decreased ROM/flexibility - manual therapy, therapeutic exercise and home program  Decreased joint mobility - manual therapy and therapeutic exercise  Inflammation - cold therapy and US  Impaired muscle performance - neuro re-education and home program  Decreased  function - therapeutic activities  Diagnosis 2:  Left knee pain bursitis    Pain -  hot/cold therapy, US, self management and home program  Inflammation - cold therapy, US and self management/home program  Impaired muscle performance - neuro re-education  Decreased function - therapeutic activities    Therapy Evaluation Codes:   1) History comprised of:   Personal factors that impact the plan of care:      Coping style, Overall behavior pattern and Past/current experiences.    Comorbidity factors that impact the plan of care are:      High blood pressure.     Medications impacting care: High blood pressure and Pain.  2) Examination of Body Systems comprised of:   Body structures and functions that impact the plan of care:      Hip and Knee.   Activity limitations that impact the plan of care are:      Squatting/kneeling, Stairs, Standing and Walking.  3) Clinical presentation characteristics are:   Stable/Uncomplicated.  4) Decision-Making    Low complexity using standardized patient assessment instrument and/or measureable assessment of functional outcome.  Cumulative Therapy Evaluation is: Low complexity.    Previous and current functional limitations:  (See Goal Flow Sheet for this information)    Short term and Long term goals: (See Goal Flow Sheet for this information)     Communication ability:  Patient appears to be able to clearly communicate and understand verbal and written communication and follow directions correctly.  Treatment Explanation - The following has been discussed with the patient:   RX ordered/plan of care  Anticipated outcomes  Possible risks and side effects  This patient would benefit from PT intervention to resume normal activities.   Rehab potential is good.    Frequency:  1 X week, once daily  Duration:  for 8 weeks  Discharge Plan:  Achieve all LTG.  Independent in home treatment program.  Reach maximal therapeutic benefit.    Please refer to the daily flowsheet for treatment today,  total treatment time and time spent performing 1:1 timed codes.

## 2021-11-08 ENCOUNTER — OFFICE VISIT (OUTPATIENT)
Dept: FAMILY MEDICINE | Facility: CLINIC | Age: 51
End: 2021-11-08
Payer: COMMERCIAL

## 2021-11-08 VITALS
SYSTOLIC BLOOD PRESSURE: 128 MMHG | HEIGHT: 65 IN | BODY MASS INDEX: 31.65 KG/M2 | HEART RATE: 83 BPM | OXYGEN SATURATION: 99 % | DIASTOLIC BLOOD PRESSURE: 86 MMHG | TEMPERATURE: 98.2 F | WEIGHT: 190 LBS

## 2021-11-08 DIAGNOSIS — N64.4 BREAST PAIN, LEFT: Primary | ICD-10-CM

## 2021-11-08 DIAGNOSIS — Z12.11 SPECIAL SCREENING FOR MALIGNANT NEOPLASMS, COLON: ICD-10-CM

## 2021-11-08 PROCEDURE — 99213 OFFICE O/P EST LOW 20 MIN: CPT | Performed by: PHYSICIAN ASSISTANT

## 2021-11-08 RX ORDER — IBUPROFEN 800 MG/1
800 TABLET, FILM COATED ORAL EVERY 8 HOURS PRN
Qty: 90 TABLET | Refills: 0 | Status: SHIPPED | OUTPATIENT
Start: 2021-11-08 | End: 2022-01-21

## 2021-11-08 ASSESSMENT — MIFFLIN-ST. JEOR: SCORE: 1469.77

## 2021-11-08 NOTE — PROGRESS NOTES
"  Assessment & Plan     Breast pain, left  Still suspect from injury.  Has an update mammogram so if not improving will get an ultrasound.    - ibuprofen (ADVIL/MOTRIN) 800 MG tablet; Take 1 tablet (800 mg) by mouth every 8 hours as needed for moderate pain    Special screening for malignant neoplasms, colon    - Adult Gastro Ref - Procedure Only; Future                 No follow-ups on file.    GUANACO Salmeron [facility] TRINA Benavides is a 51 year old who presents for the following health issues  accompanied by her self.    HPI     Chief Complaint   Patient presents with     Breast Pain     Fup, needs order for diagnostic mammo   started after grandchild hit her in left breast.  This happened about 3 weeks ago.  Still painful if she touches it.  Before this no pain or breast concerns.    Before this no lumps.  Pain is on upper left breasts.    No discharge.  Will get some shooting pain down occasionally.  Topical cream didn't help.  Didn't try anything oral.          Review of Systems   As above      Objective    /86 (BP Location: Right arm, Patient Position: Sitting, Cuff Size: Adult Large)   Pulse 83   Temp 98.2  F (36.8  C) (Oral)   Ht 1.638 m (5' 4.5\")   Wt 86.2 kg (190 lb)   LMP 02/28/2014   SpO2 99%   BMI 32.11 kg/m    Body mass index is 32.11 kg/m .  Physical Exam  Constitutional:       General: She is not in acute distress.  Cardiovascular:      Rate and Rhythm: Normal rate and regular rhythm.   Pulmonary:      Effort: Pulmonary effort is normal.      Breath sounds: Normal breath sounds.   Chest:   Breasts:      Right: Tenderness present. No mass or skin change.      Left: Skin change (slight bruising noted about 11 o'clock position) and tenderness present.       Neurological:      Mental Status: She is alert.                        "

## 2021-11-08 NOTE — PATIENT INSTRUCTIONS
In a week or week and 1/2 and if not improving will get an ultrasound   Ask pharmacy about J&J vaccine booster     Ok to use heat or ice and ok to wrap for comfort measures

## 2021-12-06 ENCOUNTER — TELEPHONE (OUTPATIENT)
Dept: GASTROENTEROLOGY | Facility: CLINIC | Age: 51
End: 2021-12-06
Payer: COMMERCIAL

## 2021-12-06 NOTE — TELEPHONE ENCOUNTER
Screening Questions  1. Are you active on mychart? YES    2. What insurance is in the chart? BCBS    2.  Ordering/Referring Provider: Angelica Hills PA-C in [facility]    3. BMI 31.9, If greater than 40 review exclusion criteria    4.  Respiratory Screening (If yes to any of the following Hospital setting only):     Do you use daily home oxygen? N  Do you have mod to severe Obstructive Sleep Apnea? N   Do you have Pulmonary Hypertension? N   Do you have UNCONTROLLED asthma? N    5. Have you had a heart or lung transplant (If yes, please review exclusion criteria) ? N    6. Are you currently on dialysis or have chronic kidney disease? N    7. Have you had a stroke or Transient ischemic attack (TIA) within 6 months? N    8. In the past 6 months, have you had any heart related issues including cardiomyopathy or heart attack? N                 If yes, did it require cardiac stenting or other implantable device?N      9. Do you have any implantable devices in your body (pacemaker, defib, LVAD)? N    10. Do you take nitroglycerin? If yes, how often? N    11. Are you currently taking any blood thinners?N    12. Are you a diabetic? N    13. (Females) Are you currently pregnant? N  If yes, how many weeks?      15. Are you taking any prescription pain medications on a routine schedule? N If yes, MAC sedation.    16. Do you have any chemical dependencies such as alcohol, street drugs, or methadone? NIf yes, MAC sedation.    17. Do you have any history of post-traumatic stress syndrome, severe anxiety or history of psychosis? N    18. Do you transfer independently? Y    19.  Do you have any issues with constipation? N    20. Preferred Pharmacy for Pre Prescription [facility]     Scheduling Details    Which Colonoscopy Prep was Sent?: MIRALAX   Procedure Scheduled: COLONOSCOPY   Surgeon: DR. BOSS  Date of Procedure: 01/04/2022  Location:   Caller (Please ask for phone number if not scheduled by patient):  RUTH       Sedation Type: CS  Conscious Sedation- Needs  for 6 hours after the procedure  MAC/General-Needs  for 24 hours after procedure    Pre-op Required at [facility] [place], [place] and OR for MAC sedation:   (if yes advise patient they will need a pre-op prior to procedure)      Is patient on blood thinners? -N (If yes- inform patient to follow up with PCP or provider for follow up instructions)     Informed patient they will need an adult  Y  Cannot take any type of public or medical transportation alone    Pre-Procedure Covid test to be completed at [facility] or Externally: SCHEDULED    Confirmed Nurse will call to complete assessment Y    Additional comments: N

## 2021-12-07 DIAGNOSIS — Z11.59 ENCOUNTER FOR SCREENING FOR OTHER VIRAL DISEASES: ICD-10-CM

## 2021-12-31 ENCOUNTER — LAB (OUTPATIENT)
Dept: LAB | Facility: CLINIC | Age: 51
End: 2021-12-31
Payer: COMMERCIAL

## 2021-12-31 DIAGNOSIS — R51.9 CHRONIC NONINTRACTABLE HEADACHE, UNSPECIFIED HEADACHE TYPE: ICD-10-CM

## 2021-12-31 DIAGNOSIS — Z11.59 ENCOUNTER FOR SCREENING FOR OTHER VIRAL DISEASES: ICD-10-CM

## 2021-12-31 DIAGNOSIS — G89.29 CHRONIC NONINTRACTABLE HEADACHE, UNSPECIFIED HEADACHE TYPE: ICD-10-CM

## 2021-12-31 DIAGNOSIS — R00.0 TACHYCARDIA: ICD-10-CM

## 2021-12-31 DIAGNOSIS — I10 ESSENTIAL HYPERTENSION WITH GOAL BLOOD PRESSURE LESS THAN 130/80: ICD-10-CM

## 2021-12-31 PROCEDURE — U0005 INFEC AGEN DETEC AMPLI PROBE: HCPCS

## 2021-12-31 PROCEDURE — U0003 INFECTIOUS AGENT DETECTION BY NUCLEIC ACID (DNA OR RNA); SEVERE ACUTE RESPIRATORY SYNDROME CORONAVIRUS 2 (SARS-COV-2) (CORONAVIRUS DISEASE [COVID-19]), AMPLIFIED PROBE TECHNIQUE, MAKING USE OF HIGH THROUGHPUT TECHNOLOGIES AS DESCRIBED BY CMS-2020-01-R: HCPCS

## 2022-01-01 NOTE — TELEPHONE ENCOUNTER
Routing refill request to provider for review/approval because:  Labs not current:    Potassium   Date Value Ref Range Status   07/16/2019 4.1 3.4 - 5.3 mmol/L Final     Creatinine   Date Value Ref Range Status   07/16/2019 0.67 0.52 - 1.04 mg/dL Final

## 2022-01-03 RX ORDER — METOPROLOL SUCCINATE 25 MG/1
TABLET, EXTENDED RELEASE ORAL
Qty: 90 TABLET | Refills: 3 | Status: SHIPPED | OUTPATIENT
Start: 2022-01-03 | End: 2022-01-07

## 2022-01-03 RX ORDER — LOSARTAN POTASSIUM 25 MG/1
TABLET ORAL
Qty: 90 TABLET | Refills: 3 | Status: SHIPPED | OUTPATIENT
Start: 2022-01-03 | End: 2022-01-07

## 2022-01-04 ENCOUNTER — HOSPITAL ENCOUNTER (OUTPATIENT)
Facility: AMBULATORY SURGERY CENTER | Age: 52
Discharge: HOME | End: 2022-01-04
Attending: INTERNAL MEDICINE | Admitting: INTERNAL MEDICINE
Payer: COMMERCIAL

## 2022-01-04 VITALS
DIASTOLIC BLOOD PRESSURE: 96 MMHG | RESPIRATION RATE: 16 BRPM | SYSTOLIC BLOOD PRESSURE: 133 MMHG | OXYGEN SATURATION: 96 % | TEMPERATURE: 97.8 F | HEART RATE: 76 BPM

## 2022-01-04 LAB — COLONOSCOPY: NORMAL

## 2022-01-04 PROCEDURE — 45380 COLONOSCOPY AND BIOPSY: CPT

## 2022-01-04 PROCEDURE — 88305 TISSUE EXAM BY PATHOLOGIST: CPT | Performed by: PATHOLOGY

## 2022-01-04 PROCEDURE — G8918 PT W/O PREOP ORDER IV AB PRO: HCPCS

## 2022-01-04 PROCEDURE — G8907 PT DOC NO EVENTS ON DISCHARG: HCPCS

## 2022-01-04 RX ORDER — NALOXONE HYDROCHLORIDE 0.4 MG/ML
0.4 INJECTION, SOLUTION INTRAMUSCULAR; INTRAVENOUS; SUBCUTANEOUS
Status: DISCONTINUED | OUTPATIENT
Start: 2022-01-04 | End: 2022-01-05 | Stop reason: HOSPADM

## 2022-01-04 RX ORDER — FLUMAZENIL 0.1 MG/ML
0.2 INJECTION, SOLUTION INTRAVENOUS
Status: ACTIVE | OUTPATIENT
Start: 2022-01-04 | End: 2022-01-04

## 2022-01-04 RX ORDER — NALOXONE HYDROCHLORIDE 0.4 MG/ML
0.2 INJECTION, SOLUTION INTRAMUSCULAR; INTRAVENOUS; SUBCUTANEOUS
Status: DISCONTINUED | OUTPATIENT
Start: 2022-01-04 | End: 2022-01-05 | Stop reason: HOSPADM

## 2022-01-04 RX ORDER — FENTANYL CITRATE 50 UG/ML
INJECTION, SOLUTION INTRAMUSCULAR; INTRAVENOUS PRN
Status: DISCONTINUED | OUTPATIENT
Start: 2022-01-04 | End: 2022-01-04 | Stop reason: HOSPADM

## 2022-01-04 RX ORDER — ONDANSETRON 2 MG/ML
4 INJECTION INTRAMUSCULAR; INTRAVENOUS EVERY 6 HOURS PRN
Status: DISCONTINUED | OUTPATIENT
Start: 2022-01-04 | End: 2022-01-05 | Stop reason: HOSPADM

## 2022-01-04 RX ORDER — PROCHLORPERAZINE MALEATE 10 MG
10 TABLET ORAL EVERY 6 HOURS PRN
Status: DISCONTINUED | OUTPATIENT
Start: 2022-01-04 | End: 2022-01-05 | Stop reason: HOSPADM

## 2022-01-04 RX ORDER — ONDANSETRON 4 MG/1
4 TABLET, ORALLY DISINTEGRATING ORAL EVERY 6 HOURS PRN
Status: DISCONTINUED | OUTPATIENT
Start: 2022-01-04 | End: 2022-01-05 | Stop reason: HOSPADM

## 2022-01-04 RX ORDER — ONDANSETRON 2 MG/ML
4 INJECTION INTRAMUSCULAR; INTRAVENOUS
Status: DISCONTINUED | OUTPATIENT
Start: 2022-01-04 | End: 2022-01-05 | Stop reason: HOSPADM

## 2022-01-04 RX ORDER — LIDOCAINE 40 MG/G
CREAM TOPICAL
Status: DISCONTINUED | OUTPATIENT
Start: 2022-01-04 | End: 2022-01-05 | Stop reason: HOSPADM

## 2022-01-04 NOTE — H&P
[facility] Anesthesia Pre-op History and Physical    Makayla Diaz MRN# [id number]   Age: 51 year old YOB: 1970      Date of Surgery: 1/4/2022     Date of Exam 1/4/2022         Primary care provider: Angelica Hills         Chief Complaint and/or Reason for Procedure:     Colon cancer screening         Active problem list:     Patient Active Problem List    Diagnosis Date Noted     Chronic insomnia 08/22/2019     Priority: Medium     Syrinx of spinal cord (H) 02/21/2018     Priority: Medium     Essential hypertension with goal blood pressure less than 130/80 10/16/2017     Priority: Medium     ACP (advance care planning) 02/08/2016     Priority: Medium     Advance Care Planning 2/8/2016: ACP Review of Chart / Resources Provided:  Reviewed chart for advance care plan.  Makayla Diaz has no plan or code status on file. Discussed available resources and provided with information. Confirmed code status reflects current choices pending further ACP discussions.  Confirmed/documented legally designated decision maker(s). Added by Tonya Jimenez             Mid back pain on left side 04/23/2014     Priority: Medium     Neck pain on left side 11/29/2013     Priority: Medium     Chronic nonintractable headache 04/30/2012     Priority: Medium     Problem list name updated by automated process. Provider to review       Neck pain 02/09/2012     Priority: Medium     Mastodynia 10/10/2011     Priority: Medium     Fibroadenoma of breast 10/10/2011     Priority: Medium     Obesity 05/24/2011     Priority: Medium     CARDIOVASCULAR SCREENING; LDL GOAL LESS THAN 160 10/31/2010     Priority: Medium     [place] 10-year CHD Risk Score: <1% (8 Total Points)   Values used to calculate score:     Age: 43 years -- Points: 0     Total Cholesterol: 201 mg/dL -- Points: 6     HDL Cholesterol: 52 mg/dL -- Points: 0     Systolic BP (untreated): 136 mmHg -- Points: 2     The patient is not a smoker.  -- Points: 0     The patient has not been diagnosed with diabetes. -- Points: 0     The patient does not have a family history of CHD. -- Points:0                 Medications (include herbals and vitamins):   Any Plavix use in the last 7 days? No     Current Outpatient Medications   Medication Sig     ibuprofen (ADVIL/MOTRIN) 800 MG tablet Take 1 tablet (800 mg) by mouth every 8 hours as needed for moderate pain     losartan (COZAAR) 25 MG tablet TAKE 1 TABLET DAILY     metoprolol succinate ER (TOPROL-XL) 25 MG 24 hr tablet TAKE 1 TABLET DAILY     Current Facility-Administered Medications   Medication     lidocaine (LMX4) kit     lidocaine 1 % 0.1-1 mL     ondansetron (ZOFRAN) injection 4 mg     sodium chloride (PF) 0.9% PF flush 3 mL     sodium chloride (PF) 0.9% PF flush 3 mL             Allergies:      Allergies   Allergen Reactions     Strawberry Anaphylaxis     Lisinopril      cough     Allergy to Latex? No  Allergy to tape?   No  Intolerances:             Physical Exam:   All vitals have been reviewed  Patient Vitals for the past 8 hrs:   BP Temp Temp src Pulse Resp SpO2   01/04/22 0850 (!) 145/100 -- -- 87 16 100 %   01/04/22 0845 127/87 -- -- 82 16 99 %   01/04/22 0840 (!) 125/97 -- -- 85 16 96 %   01/04/22 0835 (!) 131/109 -- -- 86 16 93 %   01/04/22 0830 (!) 148/95 -- -- 83 18 97 %   01/04/22 0825 129/76 -- -- 82 16 95 %   01/04/22 0820 (!) 159/91 -- -- 87 16 99 %   01/04/22 0818 (!) 159/94 -- -- 78 16 99 %   01/04/22 0817 -- -- -- -- 16 --   01/04/22 0750 (!) 145/99 97.8  F (36.6  C) Temporal -- 16 96 %     No intake/output data recorded.  Lungs:   No increased work of breathing, good air exchange, clear to auscultation bilaterally, no crackles or wheezing     Cardiovascular:   normal S1 and S2             Lab / Radiology Results:            Anesthetic risk and/or ASA classification:   2    Caroline Davis DO

## 2022-01-06 LAB
PATH REPORT.COMMENTS IMP SPEC: NORMAL
PATH REPORT.COMMENTS IMP SPEC: NORMAL
PATH REPORT.FINAL DX SPEC: NORMAL
PATH REPORT.GROSS SPEC: NORMAL
PATH REPORT.MICROSCOPIC SPEC OTHER STN: NORMAL
PATH REPORT.RELEVANT HX SPEC: NORMAL
PHOTO IMAGE: NORMAL

## 2022-01-07 DIAGNOSIS — G89.29 CHRONIC NONINTRACTABLE HEADACHE, UNSPECIFIED HEADACHE TYPE: ICD-10-CM

## 2022-01-07 DIAGNOSIS — R51.9 CHRONIC NONINTRACTABLE HEADACHE, UNSPECIFIED HEADACHE TYPE: ICD-10-CM

## 2022-01-07 DIAGNOSIS — I10 ESSENTIAL HYPERTENSION WITH GOAL BLOOD PRESSURE LESS THAN 130/80: ICD-10-CM

## 2022-01-07 DIAGNOSIS — R00.0 TACHYCARDIA: ICD-10-CM

## 2022-01-12 ENCOUNTER — OFFICE VISIT (OUTPATIENT)
Dept: OPTOMETRY | Facility: CLINIC | Age: 52
End: 2022-01-12
Payer: COMMERCIAL

## 2022-01-12 DIAGNOSIS — H52.223 REGULAR ASTIGMATISM OF BOTH EYES: ICD-10-CM

## 2022-01-12 DIAGNOSIS — H52.13 MYOPIA OF BOTH EYES: ICD-10-CM

## 2022-01-12 DIAGNOSIS — Z01.01 ENCOUNTER FOR EXAMINATION OF EYES AND VISION WITH ABNORMAL FINDINGS: Primary | ICD-10-CM

## 2022-01-12 DIAGNOSIS — H52.4 PRESBYOPIA: ICD-10-CM

## 2022-01-12 PROCEDURE — 92014 COMPRE OPH EXAM EST PT 1/>: CPT | Performed by: OPTOMETRIST

## 2022-01-12 PROCEDURE — 92015 DETERMINE REFRACTIVE STATE: CPT | Performed by: OPTOMETRIST

## 2022-01-12 ASSESSMENT — SLIT LAMP EXAM - LIDS
COMMENTS: NORMAL
COMMENTS: NORMAL

## 2022-01-12 ASSESSMENT — VISUAL ACUITY
OD_SC: 20/40
METHOD: SNELLEN - LINEAR
OS_SC: 20/40
OS_SC: 20/60
OD_SC: 20/60

## 2022-01-12 ASSESSMENT — CONF VISUAL FIELD
OD_NORMAL: 1
METHOD: COUNTING FINGERS
OS_NORMAL: 1

## 2022-01-12 ASSESSMENT — CUP TO DISC RATIO
OD_RATIO: 0.35
OS_RATIO: 0.4

## 2022-01-12 ASSESSMENT — REFRACTION_MANIFEST
OS_AXIS: 098
OS_ADD: +1.50
OD_AXIS: 075
OD_SPHERE: -0.75
OS_CYLINDER: +0.75
OS_SPHERE: -1.25
OD_ADD: +1.50
OD_CYLINDER: +0.50

## 2022-01-12 ASSESSMENT — TONOMETRY
IOP_METHOD: APPLANATION
OD_IOP_MMHG: 12
OS_IOP_MMHG: 12

## 2022-01-12 ASSESSMENT — EXTERNAL EXAM - RIGHT EYE: OD_EXAM: NORMAL

## 2022-01-12 ASSESSMENT — EXTERNAL EXAM - LEFT EYE: OS_EXAM: NORMAL

## 2022-01-12 NOTE — PATIENT INSTRUCTIONS
Makayla was advised of today's exam findings.  Fill glasses prescription  Allow 2 weeks to adapt to change in glasses  Wear glasses full time  Copy of glasses Rx provided today.    Return in 1 year for eye exam, or sooner if needed.    The effects of the dilating drops last for 4- 6 hours.  You will be more sensitive to light and vision will be blurry up close.  Mydriatic sunglasses were given if needed.      Wood Leroy O.D.  [facility] [place]  [address] [place] [address]. [place]  [place]  [address]    [phone]

## 2022-01-12 NOTE — PROGRESS NOTES
Chief Complaint   Patient presents with     Annual Eye Exam         Last Eye Exam: 1/2021 - [facility]  Dilated Previously: Yes, side effects of dilation explained today    What are you currently using to see?  Glasses - got them last year but they stopped working for her after about 6 months. Still wearing them even though they're not working well. Did not bring with her today       Distance Vision Acuity: Noticed gradual change in both eyes    Near Vision Acuity: Not satisfied     Eye Comfort: dry  Do you use eye drops? : No: Used Visine once ~3 months ago which burned her eyes. Has not used since.   Occupation or Hobbies: [employer]        Medical, surgical and family histories reviewed and updated 1/12/2022.       OBJECTIVE: See Ophthalmology exam    ASSESSMENT:    ICD-10-CM    1. Encounter for examination of eyes and vision with abnormal findings  Z01.01    2. Myopia of both eyes  H52.13    3. Regular astigmatism of both eyes  H52.223    4. Presbyopia  H52.4       PLAN:     Patient Instructions   Makayla was advised of today's exam findings.  Fill glasses prescription  Allow 2 weeks to adapt to change in glasses  Wear glasses full time  Copy of glasses Rx provided today.    Return in 1 year for eye exam, or sooner if needed.    The effects of the dilating drops last for 4- 6 hours.  You will be more sensitive to light and vision will be blurry up close.  Mydriatic sunglasses were given if needed.      Wood Leroy O.D.  [address] [place]  [address]    [phone]

## 2022-01-12 NOTE — LETTER
1/12/2022         RE: Makayla Diaz  [address]  [place] [address]        Dear Colleague,    Thank you for referring your patient, Makayla Diaz, to the [facility]. Please see a copy of my visit note below.    Chief Complaint   Patient presents with     Annual Eye Exam         Last Eye Exam: 1/2021 - [facility]  Dilated Previously: Yes, side effects of dilation explained today    What are you currently using to see?  Glasses - got them last year but they stopped working for her after about 6 months. Still wearing them even though they're not working well. Did not bring with her today       Distance Vision Acuity: Noticed gradual change in both eyes    Near Vision Acuity: Not satisfied     Eye Comfort: dry  Do you use eye drops? : No: Used Visine once ~3 months ago which burned her eyes. Has not used since.   Occupation or Hobbies: [employer]        Medical, surgical and family histories reviewed and updated 1/12/2022.       OBJECTIVE: See Ophthalmology exam    ASSESSMENT:    ICD-10-CM    1. Encounter for examination of eyes and vision with abnormal findings  Z01.01    2. Myopia of both eyes  H52.13    3. Regular astigmatism of both eyes  H52.223    4. Presbyopia  H52.4       PLAN:     Patient Instructions   Makayla was advised of today's exam findings.  Fill glasses prescription  Allow 2 weeks to adapt to change in glasses  Wear glasses full time  Copy of glasses Rx provided today.    Return in 1 year for eye exam, or sooner if needed.    The effects of the dilating drops last for 4- 6 hours.  You will be more sensitive to light and vision will be blurry up close.  Mydriatic sunglasses were given if needed.      Wood Leroy O.D.  [address] [place]  [address]    [phone]             Again, thank you for allowing me to participate in the care of your patient.        Sincerely,        Wood WILKES  Mariaa, OD

## 2022-01-13 ENCOUNTER — MYC MEDICAL ADVICE (OUTPATIENT)
Dept: FAMILY MEDICINE | Facility: CLINIC | Age: 52
End: 2022-01-13
Payer: COMMERCIAL

## 2022-01-14 RX ORDER — LOSARTAN POTASSIUM 25 MG/1
25 TABLET ORAL DAILY
Qty: 90 TABLET | Refills: 3 | Status: SHIPPED | OUTPATIENT
Start: 2022-01-14

## 2022-01-14 RX ORDER — METOPROLOL SUCCINATE 25 MG/1
25 TABLET, EXTENDED RELEASE ORAL DAILY
Qty: 90 TABLET | Refills: 3 | Status: SHIPPED | OUTPATIENT
Start: 2022-01-14

## 2022-01-14 NOTE — TELEPHONE ENCOUNTER
Per 1/13/2022 wooju message, patient is now using [facility] mail order pharmacy    Prescriptions sent to [facility] mail order pharmacy    Ammy Omalley RN  [facility]

## 2022-01-20 ENCOUNTER — ANCILLARY PROCEDURE (OUTPATIENT)
Dept: GENERAL RADIOLOGY | Facility: CLINIC | Age: 52
End: 2022-01-20
Attending: PHYSICIAN ASSISTANT
Payer: COMMERCIAL

## 2022-01-20 ENCOUNTER — OFFICE VISIT (OUTPATIENT)
Dept: FAMILY MEDICINE | Facility: CLINIC | Age: 52
End: 2022-01-20
Payer: COMMERCIAL

## 2022-01-20 VITALS
WEIGHT: 184.8 LBS | TEMPERATURE: 98 F | HEIGHT: 64 IN | OXYGEN SATURATION: 98 % | BODY MASS INDEX: 31.55 KG/M2 | DIASTOLIC BLOOD PRESSURE: 64 MMHG | SYSTOLIC BLOOD PRESSURE: 124 MMHG | HEART RATE: 75 BPM

## 2022-01-20 DIAGNOSIS — M54.12 CERVICAL RADICULOPATHY: ICD-10-CM

## 2022-01-20 DIAGNOSIS — M79.642 PAIN OF LEFT HAND: ICD-10-CM

## 2022-01-20 DIAGNOSIS — M79.642 PAIN OF LEFT HAND: Primary | ICD-10-CM

## 2022-01-20 LAB
ANION GAP SERPL CALCULATED.3IONS-SCNC: 2 MMOL/L (ref 3–14)
BUN SERPL-MCNC: 8 MG/DL (ref 7–30)
CALCIUM SERPL-MCNC: 9.1 MG/DL (ref 8.5–10.1)
CHLORIDE BLD-SCNC: 110 MMOL/L (ref 94–109)
CO2 SERPL-SCNC: 28 MMOL/L (ref 20–32)
CREAT SERPL-MCNC: 0.7 MG/DL (ref 0.52–1.04)
CRP SERPL-MCNC: 3 MG/L (ref 0–8)
GFR SERPL CREATININE-BSD FRML MDRD: >90 ML/MIN/1.73M2
GLUCOSE BLD-MCNC: 83 MG/DL (ref 70–99)
POTASSIUM BLD-SCNC: 4.2 MMOL/L (ref 3.4–5.3)
SODIUM SERPL-SCNC: 140 MMOL/L (ref 133–144)

## 2022-01-20 PROCEDURE — 99214 OFFICE O/P EST MOD 30 MIN: CPT | Performed by: PHYSICIAN ASSISTANT

## 2022-01-20 PROCEDURE — 86431 RHEUMATOID FACTOR QUANT: CPT | Performed by: PHYSICIAN ASSISTANT

## 2022-01-20 PROCEDURE — 73130 X-RAY EXAM OF HAND: CPT | Mod: LT | Performed by: RADIOLOGY

## 2022-01-20 PROCEDURE — 80048 BASIC METABOLIC PNL TOTAL CA: CPT | Performed by: PHYSICIAN ASSISTANT

## 2022-01-20 PROCEDURE — 72040 X-RAY EXAM NECK SPINE 2-3 VW: CPT | Performed by: RADIOLOGY

## 2022-01-20 PROCEDURE — 36415 COLL VENOUS BLD VENIPUNCTURE: CPT | Performed by: PHYSICIAN ASSISTANT

## 2022-01-20 PROCEDURE — 86140 C-REACTIVE PROTEIN: CPT | Performed by: PHYSICIAN ASSISTANT

## 2022-01-20 RX ORDER — NAPROXEN 500 MG/1
500 TABLET ORAL 2 TIMES DAILY WITH MEALS
Qty: 30 TABLET | Refills: 1 | Status: SHIPPED | OUTPATIENT
Start: 2022-01-20

## 2022-01-20 ASSESSMENT — MIFFLIN-ST. JEOR: SCORE: 1441.63

## 2022-01-20 NOTE — PROGRESS NOTES
"  Assessment & Plan     Pain of left hand  - BASIC METABOLIC PANEL; Future  - CRP, inflammation; Future  - XR Hand Left G/E 3 Views; Future  - Rheumatoid factor; Future  - naproxen (NAPROSYN) 500 MG tablet; Take 1 tablet (500 mg) by mouth 2 times daily (with meals)  - Rheumatoid factor  - CRP, inflammation  - BASIC METABOLIC PANEL    Cervical radiculopathy  - XR Cervical Spine 2/3 Views; Future  - [facility] PT and Hand Referral; Future  - naproxen (NAPROSYN) 500 MG tablet; Take 1 tablet (500 mg) by mouth 2 times daily (with meals)        Return in about 6 weeks (around 3/3/2022) for follow up if symptoms persist, change or worsen.    Gena Elias PA-C  [facility] TRINA Benavides is a 51 year old who presents for the following health issues  accompanied by her self.    HPI     Pain History:  When did you first notice your pain? - 6 months  Have you seen anyone else for your pain? No  Where in your body do you have pain? Musculoskeletal problem/pain  Onset/Duration: 6 months  Description  Location: fingers - left  Joint Swelling: no  Redness: no  Pain: YES and stiffness   Warmth: no  Intensity:  moderate  Progression of Symptoms:  worsening  Accompanying signs and symptoms:   Fevers: no  Numbness/tingling/weakness: YES- weakness and tingling  History  Trauma to the area: no  Recent illness:  no  Previous similar problem: no  Previous evaluation:  no  Precipitating or alleviating factors:  Aggravating factors include: using the left hand  Therapies tried and outcome: ice and acetaminophen    Patient went to pick something up with index grasp of L hand and noted tremulousness.  Never had this before.  No R handed sx.      Review of Systems   Constitutional, HEENT, cardiovascular, pulmonary, gi and gu systems are negative, except as otherwise noted.      Objective    /64   Pulse 75   Temp 98  F (36.7  C) (Oral)   Ht 1.631 m (5' 4.21\")   Wt 83.8 kg (184 lb 12.8 oz)   LMP " 02/28/2014   SpO2 98%   BMI 31.51 kg/m    Body mass index is 31.51 kg/m .  Physical Exam   GENERAL: healthy, alert and no distress  MS: normal muscle tone, normal range of motion, no edema and neck exam shows normal strength, no torticollis, ROM is normal and + facet loading on L.   SKIN: no suspicious lesions or rashes  NEURO: Normal strength and tone, mentation intact and speech normal

## 2022-01-20 NOTE — PATIENT INSTRUCTIONS
Patient Education     Understanding Cervical Radiculopathy    Cervical radiculopathy is irritation or inflammation of a nerve root in the neck. It causes neck pain and other symptoms that may spread into the chest or down the arm. To understand this condition, it helps to understand the parts of the spine:    Vertebrae. These are bones that stack to form the spine. The cervical spine contains the 7 vertebrae in the neck.    Disks. These are soft pads of tissue between the vertebrae. They act as shock absorbers for the spine.    The spinal canal. This is a tunnel formed within the stacked vertebrae. The spinal cord runs through this canal.    Nerves. These branch off the spinal cord. As they leave the spinal canal, nerves pass through openings between the vertebrae. The nerve root is the part of the nerve that is closest to the spinal cord.   With cervical radiculopathy, nerve roots in the neck become irritated. This leads to pain and symptoms that can travel to the nerves that go from the spinal cord down the arms and into the torso.  What causes cervical radiculopathy?  Aging, injury, poor posture, and other issues can lead to problems in the neck. These problems may then irritate nerve roots. These include:    Damage to a disk in the cervical spine. The damaged disk may then press on nearby nerve roots.    Degeneration from wear and tear, and aging. This can lead to narrowing (stenosis) of the openings between the vertebrae. The narrowed openings press on nerve roots as they leave the spinal canal.    An unstable spine. This is when a vertebra slips forward. It can then press on a nerve root.  There are other, less common causes of pressure on nerves in the neck. These include infection, cysts, and tumors.  Symptoms of cervical radiculopathy  These include:    Neck pain    Pain, numbness, tingling, or weakness that travels down the arm    Loss of neck movement    Muscle spasms  Treatment for cervical  radiculopathy  In most cases, your healthcare provider will first try treatments that help relieve symptoms. These may include:    Prescription or over-the-counter pain medicines. These help relieve pain and swelling.    Cold packs. These help reduce pain.    Resting. This involves avoiding positions and activities that increase pain.    Neck brace (cervical collar). This can help relieve inflammation and pain.    Physical therapy, including exercises and stretches. This can help decrease pain and increase movement and function.    Shots of medicinesaround the nerve roots. This is done to help relieve symptoms for a time.  In some cases, your healthcare provider may advise surgery to fix the underlying problem. This depends on the cause, the symptoms, and how long the pain has lasted.  Possible complications  Over time, an irritated and inflamed nerve may become damaged. This may lead to long-lasting (permanent) numbness or weakness. If symptoms change suddenly or get worse, be sure to let your healthcare provider know.     When to call your healthcare provider  Call your healthcare provider right away if you have any of these:    New pain or pain that gets worse    New or increasing weakness, numbness, or tingling in your arm or hand    Bowel or bladder changes   [employer] last reviewed this educational content on 3/10/2016    [id number] [employer]. All rights reserved. This information is not intended as a substitute for professional medical care. Always follow your healthcare professional's instructions.

## 2022-01-21 LAB — RHEUMATOID FACT SER NEPH-ACNC: <7 IU/ML

## 2022-02-01 ENCOUNTER — THERAPY VISIT (OUTPATIENT)
Dept: PHYSICAL THERAPY | Facility: CLINIC | Age: 52
End: 2022-02-01
Attending: PHYSICIAN ASSISTANT
Payer: COMMERCIAL

## 2022-02-01 DIAGNOSIS — M54.12 CERVICAL RADICULOPATHY: ICD-10-CM

## 2022-02-01 PROCEDURE — 97110 THERAPEUTIC EXERCISES: CPT | Mod: GP | Performed by: PHYSICAL THERAPIST

## 2022-02-01 PROCEDURE — 97161 PT EVAL LOW COMPLEX 20 MIN: CPT | Mod: GP | Performed by: PHYSICAL THERAPIST

## 2022-02-01 NOTE — PROGRESS NOTES
Physical Therapy Initial Evaluation  Subjective:  The history is provided by the patient.   Therapist Generated HPI Evaluation  Problem details: Has been having pain for a couple of years but affecting work in the last 6 months. Having pain in left hand and fingers. Grabbing items like a pen can be difficult, fingers can start to tremble. Grabbing items can also be painful. Head movements do not increase symptoms. No neck pain right now. Has been told she has some arthritis in her neck..         Type of problem:  Cervical spine.    This is a chronic condition.  Condition occurred with:  Insidious onset.  Where condition occurred: for unknown reasons.    Pain is described as cramping (Dull tingle) and is constant.  Pain radiates to:  Shoulder left, upper arm left, lower arm left, elbow left and hand left. Pain is worse in the A.M..  Since onset symptoms are gradually worsening.  Associated symptoms:  Loss of motion/stiffness, loss of strength, headache, tingling and numbness. Symptoms are exacerbated by lying down  and relieved by activity/movement.      Restrictions due to condition include:  Working in normal job without restrictions.  Barriers include:  None as reported by patient.    Patient Health History  Makayla Diaz being seen for Neck pain.       Problem occurred: Unknown   Pain is reported as 4/10 on pain scale.  General health as reported by patient is good.  Pertinent medical history includes: high blood pressure.   Red flags:  None as reported by patient.  Medical allergies: other. Other medical allergies details: Strawberries and Lisinopril.       Current medications:  High blood pressure medication and pain medication.    Current occupation is [employer].   Primary job tasks include:  Computer work, lifting/carrying, prolonged sitting and prolonged standing.                                    Objective:  System              Cervical/Thoracic Evaluation    AROM:  AROM  Cervical:    Flexion:            25  Extension:       48  Rotation:         Left: 53     Right: 58  Side Bend:      Left: 30     Right:  31        Cervical Myotomes:        C4 (shrug):  Left: 5    Right: 5  C5 (Deltoid):  Left: 5    Right: 5  C6 (Biceps):  Left: 5    Right: 5  C7 (Triceps):  Left: 5    Right: 5  C8 (Thumb Ext): Left: 4-    Right: 5  T1 (Intrinsics): Left: 4-    Right: 5    Neural Tension:    Left side:  Radial and Median positive.  Left side:  Ulnar  negative.    Right side:  Radial; Ulnar and Median  negative.    Cervical Palpation:  : Negative Spurling and Cervical spine distraction.          Spinal Segmental Conclusions:  Hypomobility of C3-C6 with pain upon palpation of cervical paraspinals, levator scapulae, and upper trapezius on left               Shoulder Evaluation:  ROM:  AROM:    Flexion:  Left:  140    Right:  160                                    Special Tests:  normal                                           General     ROS    Assessment/Plan:    Patient is a 51 year old female with cervical complaints.    Patient has the following significant findings with corresponding treatment plan.                Diagnosis 1:  Cervical radiculopathy  Pain -  mechanical traction, manual therapy, self management, education, directional preference exercise and home program  Decreased ROM/flexibility - manual therapy, therapeutic exercise, therapeutic activity and home program  Decreased joint mobility - manual therapy, therapeutic exercise, therapeutic activity and home program  Decreased strength - therapeutic exercise, therapeutic activities and home program  Decreased function - therapeutic activities and home program    Therapy Evaluation Codes:     Cumulative Therapy Evaluation is: Low complexity.    Previous and current functional limitations:  (See Goal Flow Sheet for this information)    Short term and Long term goals: (See Goal Flow Sheet for this information)     Communication ability:   Patient appears to be able to clearly communicate and understand verbal and written communication and follow directions correctly.  Treatment Explanation - The following has been discussed with the patient:   RX ordered/plan of care  Anticipated outcomes  Possible risks and side effects  This patient would benefit from PT intervention to resume normal activities.   Rehab potential is good.    Frequency:  1 X week, once daily  Duration:  for 8 weeks  Discharge Plan:  Achieve all LTG.  Independent in home treatment program.  Reach maximal therapeutic benefit.    Please refer to the daily flowsheet for treatment today, total treatment time and time spent performing 1:1 timed codes.

## 2022-02-16 ENCOUNTER — THERAPY VISIT (OUTPATIENT)
Dept: PHYSICAL THERAPY | Facility: CLINIC | Age: 52
End: 2022-02-16
Payer: COMMERCIAL

## 2022-02-16 DIAGNOSIS — M54.12 CERVICAL RADICULOPATHY: ICD-10-CM

## 2022-02-16 PROCEDURE — 97140 MANUAL THERAPY 1/> REGIONS: CPT | Mod: GP | Performed by: PHYSICAL THERAPIST

## 2022-02-16 PROCEDURE — 97110 THERAPEUTIC EXERCISES: CPT | Mod: GP | Performed by: PHYSICAL THERAPIST

## 2022-04-01 PROBLEM — M54.12 CERVICAL RADICULOPATHY: Status: RESOLVED | Noted: 2022-02-01 | Resolved: 2022-04-01

## 2022-04-01 NOTE — PROGRESS NOTES
DISCHARGE REPORT    Progress reporting period is from 2/1/2022 to 2/16/2022.       SUBJECTIVE  Subjective: Stopped doing the stretch to side because it was affecting her hearing. Other exercises are going ok. More mobility in the arm. Thumb is still hurting after working.     Current Pain level: 3/10.     Initial Pain level: 3/10.   Changes in function:  None  Adverse reaction to treatment or activity: None    OBJECTIVE  Changes noted in objective findings:  The objective findings below are from DOS 2/16/2022.    Palpation - Hypertonicity of cervical paraspinals / Hypomobility of C3-C6     ASSESSMENT/PLAN  Updated problem list and treatment plan: Diagnosis 1:  Cervical radiculopathy  STG/LTGs have been met or progress has been made towards goals:  None  Assessment of Progress: The patient's condition is unchanged.  Self Management Plans:  Patient has been instructed in a home treatment program.  I have re-evaluated this patient and find that the nature, scope, duration and intensity of the therapy is appropriate for the medical condition of the patient.    Recommendations:  The patients has not returned to physical therapy in over 1 month and will be discharged to [facility] at this time.    Please refer to the daily flowsheet for treatment today, total treatment time and time spent performing 1:1 timed codes.

## 2022-05-04 ENCOUNTER — NURSE TRIAGE (OUTPATIENT)
Dept: NURSING | Facility: CLINIC | Age: 52
End: 2022-05-04
Payer: COMMERCIAL

## 2022-05-04 NOTE — TELEPHONE ENCOUNTER
Pt calling to report:     Has had a nagging cough for a month  Sensitive to smell--triggering cough  Normal breathing  No fever  Non-productive  Tickle back of the throat  Hasn't done anything for cough  No hx of heart disease, lung disease, blood clots  No other symptoms    Per protocol, pt should be seen in office within 3 days. Pt given care advice and when to call back, verbalized understanding. Pt transferred to scheduling.     Sadaf Balderrama RN  [facility] Nurse Advisor  05/04/22  5:34 PM      COVID 19 Nurse Triage Plan/Patient Instructions    Please be aware that novel coronavirus (COVID-19) may be circulating in the community. If you develop symptoms such as fever, cough, or SOB or if you have concerns about the presence of another infection including coronavirus (COVID-19), please contact your health care provider or visit [email or web address].     Disposition/Instructions    In-Person Visit with provider recommended. Reference Visit Selection Guide.    Thank you for taking steps to prevent the spread of this virus.  o Limit your contact with others.  o Wear a simple mask to cover your cough.  o Wash your hands well and often.    Resources    [facility]: About COVID-19: [email or web address]    CDC: What to Do If You're Sick: www.cdc.gov/coronavirus/2019-ncov/about/steps-when-sick.html    CDC: Ending Home Isolation: www.cdc.gov/coronavirus/2019-ncov/hcp/disposition-in-home-patients.html     CDC: Caring for Someone: www.cdc.gov/coronavirus/2019-ncov/if-you-are-sick/care-for-someone.html     [facility]: Interim Guidance for Hospital Discharge to Home: www.health.[facility].[place]/diseases/coronavirus/hcp/hospdischarge.pdf    [facility] clinical trials (COVID-19 research studies): [email or web address]     Below are the COVID-19 hotlines at the [place] Department of [facility] ([facility]). Interpreters are available.   o For health questions: Call 962-899-6646 or  1-242.635.9142 (7 a.m. to 7 p.m.)  o For questions about schools and childcare: Call 021-108-1549 or 1-632.180.4649 (7 a.m. to 7 p.m.)       Reason for Disposition    Cough has been present for > 3 weeks    Additional Information    Negative: Bluish (or gray) lips or face    Negative: Severe difficulty breathing (e.g., struggling for each breath, speaks in single words)    Negative: Rapid onset of cough and has hives    Negative: Coughing started suddenly after medicine, an allergic food or bee sting    Negative: Difficulty breathing after exposure to flames, smoke, or fumes    Negative: Sounds like a life-threatening emergency to the triager    Negative: Previous asthma attacks and this feels like asthma attack    Negative: Chest pain present when not coughing    Negative: Difficulty breathing    Negative: Passed out (i.e., fainted, collapsed and was not responding)    Negative: Patient sounds very sick or weak to the triager    Negative: Coughed up > 1 tablespoon (15 ml) blood (Exception: blood-tinged sputum)    Negative: Fever > 103 F (39.4 C)    Negative: Fever > 101 F (38.3 C) and over 60 years of age    Negative: Fever > 100.0 F (37.8 C) and has diabetes mellitus or a weak immune system (e.g., HIV positive, cancer chemotherapy, organ transplant, splenectomy, chronic steroids)    Negative: Fever > 100.0 F (37.8 C) and bedridden (e.g., nursing home patient, stroke, chronic illness, recovering from surgery)    Negative: Increasing ankle swelling    Negative: Wheezing is present    Negative: SEVERE coughing spells (e.g., whooping sound after coughing, vomiting after coughing)    Negative: Coughing up leonel-colored (reddish-brown) or blood-tinged sputum    Negative: Fever present > 3 days (72 hours)    Negative: Fever returns after gone for over 24 hours and symptoms worse or not improved    Negative: Using nasal washes and pain medicine > 24 hours and sinus pain persists    Negative: Known COPD or other severe  lung disease (i.e., bronchiectasis, cystic fibrosis, lung surgery) and worsening symptoms (i.e., increased sputum purulence or amount, increased breathing difficulty)    Negative: Continuous (nonstop) coughing interferes with work or school and no improvement using cough treatment per Care Advice    Negative: Patient wants to be seen    Protocols used: COUGH-A-OH

## 2022-05-05 ENCOUNTER — OFFICE VISIT (OUTPATIENT)
Dept: FAMILY MEDICINE | Facility: CLINIC | Age: 52
End: 2022-05-05
Payer: COMMERCIAL

## 2022-05-05 VITALS
OXYGEN SATURATION: 97 % | RESPIRATION RATE: 24 BRPM | BODY MASS INDEX: 30.32 KG/M2 | HEART RATE: 87 BPM | DIASTOLIC BLOOD PRESSURE: 86 MMHG | WEIGHT: 182 LBS | TEMPERATURE: 98 F | HEIGHT: 65 IN | SYSTOLIC BLOOD PRESSURE: 130 MMHG

## 2022-05-05 DIAGNOSIS — J01.10 ACUTE NON-RECURRENT FRONTAL SINUSITIS: ICD-10-CM

## 2022-05-05 DIAGNOSIS — K21.00 GASTROESOPHAGEAL REFLUX DISEASE WITH ESOPHAGITIS, UNSPECIFIED WHETHER HEMORRHAGE: ICD-10-CM

## 2022-05-05 DIAGNOSIS — R05.8 DRY COUGH: Primary | ICD-10-CM

## 2022-05-05 DIAGNOSIS — J30.2 SEASONAL ALLERGIC RHINITIS, UNSPECIFIED TRIGGER: ICD-10-CM

## 2022-05-05 PROBLEM — G95.0 SYRINX OF SPINAL CORD (H): Status: RESOLVED | Noted: 2018-02-21 | Resolved: 2022-05-05

## 2022-05-05 PROCEDURE — 99214 OFFICE O/P EST MOD 30 MIN: CPT | Performed by: FAMILY MEDICINE

## 2022-05-05 RX ORDER — CETIRIZINE HYDROCHLORIDE 10 MG/1
10 TABLET ORAL DAILY
Qty: 30 TABLET | Refills: 0 | Status: SHIPPED | OUTPATIENT
Start: 2022-05-05

## 2022-05-05 RX ORDER — FLUTICASONE PROPIONATE 50 MCG
1 SPRAY, SUSPENSION (ML) NASAL AT BEDTIME
Qty: 16 G | Refills: 0 | Status: SHIPPED | OUTPATIENT
Start: 2022-05-05

## 2022-05-05 RX ORDER — AMOXICILLIN 875 MG
875 TABLET ORAL 2 TIMES DAILY
Qty: 20 TABLET | Refills: 0 | Status: SHIPPED | OUTPATIENT
Start: 2022-05-05 | End: 2022-05-15

## 2022-05-05 ASSESSMENT — ENCOUNTER SYMPTOMS: COUGH: 1

## 2022-05-05 ASSESSMENT — PAIN SCALES - GENERAL: PAINLEVEL: NO PAIN (0)

## 2022-05-05 NOTE — PROGRESS NOTES
"  Assessment & Plan     Dry cough  Discussed with patient her dry cough could be postnasal drainage from her sinuses, could be allergies,  or  could be acid reflux, from her stomach.  Her current symptoms is more suggestive of postnasal drainage and allergies related.  We will treat her sinus congestion, treat her allergies.    Discussed with patient if that does not help then she may try Prilosec 1 tablet at bedtime for 2 weeks.    Discussed with patient if symptom does not clear,  she needs to follow-up with her PCP and get more evaluation.    Acute non-recurrent frontal sinusitis    - amoxicillin (AMOXIL) 875 MG tablet; Take 1 tablet (875 mg) by mouth 2 times daily for 10 days  - cetirizine (ZYRTEC) 10 MG tablet; Take 1 tablet (10 mg) by mouth daily  - fluticasone (FLONASE) 50 MCG/ACT nasal spray; Spray 1 spray into both nostrils At Bedtime    Seasonal allergic rhinitis, unspecified trigger    - cetirizine (ZYRTEC) 10 MG tablet; Take 1 tablet (10 mg) by mouth daily  - fluticasone (FLONASE) 50 MCG/ACT nasal spray; Spray 1 spray into both nostrils At Bedtime    Gastroesophageal reflux disease with esophagitis, unspecified whether hemorrhage  Diet modifications.  - omeprazole (PRILOSEC) 20 MG DR capsule; Take 1 capsule (20 mg) by mouth daily    BMI:   Estimated body mass index is 30.51 kg/m  as calculated from the following:    Height as of this encounter: 1.645 m (5' 4.76\").    Weight as of this encounter: 82.6 kg (182 lb).   Weight management plan: Discussed healthy diet and exercise guidelines    Work on weight loss  Regular exercise    No follow-ups on file.    Koby Scott MD  [facility]    Raina Benavides is a 52 year old who presents for the following health issues:  Dry cough for over a month now.  Patient reports she feels postnasal drainage , triggering her throat and she could coughs.  She has no fever, no chills, no history of travel, no changes in " medication.    Denies wheezing, does not smoke, no history of asthma.  No animals at the house.    Patient reports symptoms started after she had an upper respiratory,  sinus congestion.  She has no lower extremity edema.  She denies epigastric pain, denies burning sensation in the chest, she has no allergies.  No sneezing,    No chest pain and no short of breath and no wheezing.    History of Present Illness       Reason for visit:  I had a cough for almost a mouth  Symptom onset:  3-4 weeks ago    She eats 0-1 servings of fruits and vegetables daily.She consumes 1 sweetened beverage(s) daily.She exercises with enough effort to increase her heart rate 9 or less minutes per day.  She exercises with enough effort to increase her heart rate 3 or less days per week.   She is taking medications regularly.         Review of Systems   Constitutional, HEENT, cardiovascular, pulmonary, GI, , musculoskeletal, neuro, skin, endocrine and psych systems are negative, except as otherwise noted.      Objective    LMP 02/28/2014   There is no height or weight on file to calculate BMI.  Physical Exam   GENERAL: healthy, alert and no distress  HENT: ear canals and TM's normal, nose and mouth without ulcers or lesions  NECK: no adenopathy, no asymmetry, masses, or scars and thyroid normal to palpation  RESP: lungs clear to auscultation - no rales, rhonchi or wheezes  CV: regular rate and rhythm, normal S1 S2, no S3 or S4, no murmur, click or rub, no peripheral edema and peripheral pulses strong  ABDOMEN: soft, nontender, no hepatosplenomegaly, no masses and bowel sounds normal  PSYCH: mentation appears normal, affect normal/bright    Koby Scott MD

## 2022-07-31 ENCOUNTER — HEALTH MAINTENANCE LETTER (OUTPATIENT)
Age: 52
End: 2022-07-31

## 2022-10-16 ENCOUNTER — HEALTH MAINTENANCE LETTER (OUTPATIENT)
Age: 52
End: 2022-10-16

## 2022-12-03 ENCOUNTER — HEALTH MAINTENANCE LETTER (OUTPATIENT)
Age: 52
End: 2022-12-03

## 2023-07-28 NOTE — NURSING NOTE
"Chief Complaint   Patient presents with     RECHECK     cough-feels better      Patient Request     hands shake  x 3 days        Initial /86 (BP Location: Left arm, Patient Position: Sitting, Cuff Size: Adult Large)  Pulse 109  Temp 98.1  F (36.7  C)  Wt 180 lb (81.6 kg)  LMP 02/28/2014  SpO2 97%  BMI 30.66 kg/m2 Estimated body mass index is 30.66 kg/(m^2) as calculated from the following:    Height as of 2/6/18: 5' 4.25\" (1.632 m).    Weight as of this encounter: 180 lb (81.6 kg).  Medication Reconciliation: complete  Joi Ivory MA      "
LOUIS, generalized weakness

## 2023-08-26 ENCOUNTER — HEALTH MAINTENANCE LETTER (OUTPATIENT)
Age: 53
End: 2023-08-26

## 2024-01-13 ENCOUNTER — HEALTH MAINTENANCE LETTER (OUTPATIENT)
Age: 54
End: 2024-01-13

## 2024-09-19 ENCOUNTER — OFFICE VISIT (OUTPATIENT)
Dept: URBAN - METROPOLITAN AREA CLINIC 33 | Facility: CLINIC | Age: 54
End: 2024-09-19
Payer: COMMERCIAL

## 2024-09-19 DIAGNOSIS — H35.071 RETINAL TELANGIECTASIS, RIGHT EYE: Primary | ICD-10-CM

## 2024-09-19 PROCEDURE — 99204 OFFICE O/P NEW MOD 45 MIN: CPT | Performed by: OPHTHALMOLOGY

## 2024-09-19 PROCEDURE — 92134 CPTRZ OPH DX IMG PST SGM RTA: CPT | Performed by: OPHTHALMOLOGY

## 2024-09-19 ASSESSMENT — INTRAOCULAR PRESSURE
OD: 14
OS: 15

## 2024-09-25 ENCOUNTER — OFFICE VISIT (OUTPATIENT)
Dept: URBAN - METROPOLITAN AREA CLINIC 33 | Facility: CLINIC | Age: 54
End: 2024-09-25
Payer: COMMERCIAL

## 2024-09-25 DIAGNOSIS — H35.073 RETINAL TELANGIECTASIS, BILATERAL: Primary | ICD-10-CM

## 2024-09-25 PROCEDURE — 92235 FLUORESCEIN ANGRPH MLTIFRAME: CPT | Performed by: OPHTHALMOLOGY

## 2025-01-25 ENCOUNTER — HEALTH MAINTENANCE LETTER (OUTPATIENT)
Age: 55
End: 2025-01-25

## (undated) DEVICE — PAD CHUX UNDERPAD 23X24" 7136

## (undated) DEVICE — KIT ENDO FIRST STEP DISINFECTANT 200ML W/POUCH EP-4

## (undated) DEVICE — PREP CHLORAPREP 26ML TINTED ORANGE  260815

## (undated) RX ORDER — FENTANYL CITRATE 50 UG/ML
INJECTION, SOLUTION INTRAMUSCULAR; INTRAVENOUS
Status: DISPENSED
Start: 2022-01-04

## (undated) RX ORDER — ONDANSETRON 2 MG/ML
INJECTION INTRAMUSCULAR; INTRAVENOUS
Status: DISPENSED
Start: 2022-01-04